# Patient Record
Sex: FEMALE | Race: WHITE | NOT HISPANIC OR LATINO | Employment: OTHER | URBAN - METROPOLITAN AREA
[De-identification: names, ages, dates, MRNs, and addresses within clinical notes are randomized per-mention and may not be internally consistent; named-entity substitution may affect disease eponyms.]

---

## 2017-06-30 LAB — HBA1C MFR BLD HPLC: 5.7 %

## 2018-04-10 ENCOUNTER — OFFICE VISIT (OUTPATIENT)
Dept: FAMILY MEDICINE CLINIC | Facility: CLINIC | Age: 63
End: 2018-04-10
Payer: COMMERCIAL

## 2018-04-10 VITALS
DIASTOLIC BLOOD PRESSURE: 78 MMHG | WEIGHT: 192 LBS | TEMPERATURE: 96.5 F | BODY MASS INDEX: 30.86 KG/M2 | HEART RATE: 80 BPM | HEIGHT: 66 IN | SYSTOLIC BLOOD PRESSURE: 118 MMHG

## 2018-04-10 DIAGNOSIS — R73.03 PREDIABETES: ICD-10-CM

## 2018-04-10 DIAGNOSIS — Z72.0 TOBACCO USE: ICD-10-CM

## 2018-04-10 DIAGNOSIS — N18.2 STAGE 2 CHRONIC KIDNEY DISEASE: ICD-10-CM

## 2018-04-10 DIAGNOSIS — L82.1 SK (SEBORRHEIC KERATOSIS): ICD-10-CM

## 2018-04-10 DIAGNOSIS — Z11.59 ENCOUNTER FOR HEPATITIS C SCREENING TEST FOR LOW RISK PATIENT: ICD-10-CM

## 2018-04-10 DIAGNOSIS — E66.09 CLASS 1 OBESITY DUE TO EXCESS CALORIES WITHOUT SERIOUS COMORBIDITY WITH BODY MASS INDEX (BMI) OF 30.0 TO 30.9 IN ADULT: ICD-10-CM

## 2018-04-10 DIAGNOSIS — Z76.89 ENCOUNTER TO ESTABLISH CARE WITH NEW DOCTOR: Primary | ICD-10-CM

## 2018-04-10 DIAGNOSIS — K63.5 POLYP OF COLON, UNSPECIFIED PART OF COLON, UNSPECIFIED TYPE: ICD-10-CM

## 2018-04-10 DIAGNOSIS — E55.9 VITAMIN D DEFICIENCY: ICD-10-CM

## 2018-04-10 DIAGNOSIS — E03.9 ACQUIRED HYPOTHYROIDISM: ICD-10-CM

## 2018-04-10 DIAGNOSIS — K76.0 FATTY LIVER: ICD-10-CM

## 2018-04-10 DIAGNOSIS — L90.0 LICHEN SCLEROSUS: ICD-10-CM

## 2018-04-10 DIAGNOSIS — G25.0 BENIGN HEAD TREMOR: ICD-10-CM

## 2018-04-10 DIAGNOSIS — E78.49 OTHER HYPERLIPIDEMIA: ICD-10-CM

## 2018-04-10 DIAGNOSIS — D72.820 PERSISTENT LYMPHOCYTOSIS: ICD-10-CM

## 2018-04-10 DIAGNOSIS — L72.3 SEBACEOUS CYST: ICD-10-CM

## 2018-04-10 DIAGNOSIS — C44.619 BASAL CELL CARCINOMA (BCC) OF SKIN OF LEFT UPPER EXTREMITY INCLUDING SHOULDER: ICD-10-CM

## 2018-04-10 DIAGNOSIS — M25.511 ACUTE PAIN OF RIGHT SHOULDER: ICD-10-CM

## 2018-04-10 DIAGNOSIS — H35.711: ICD-10-CM

## 2018-04-10 PROBLEM — E66.811 CLASS 1 OBESITY DUE TO EXCESS CALORIES WITHOUT SERIOUS COMORBIDITY WITH BODY MASS INDEX (BMI) OF 30.0 TO 30.9 IN ADULT: Status: ACTIVE | Noted: 2018-04-10

## 2018-04-10 PROCEDURE — 99406 BEHAV CHNG SMOKING 3-10 MIN: CPT | Performed by: FAMILY MEDICINE

## 2018-04-10 PROCEDURE — 99204 OFFICE O/P NEW MOD 45 MIN: CPT | Performed by: FAMILY MEDICINE

## 2018-04-10 RX ORDER — NAPROXEN 500 MG/1
500 TABLET ORAL 2 TIMES DAILY WITH MEALS
Qty: 30 TABLET | Refills: 0 | Status: SHIPPED | OUTPATIENT
Start: 2018-04-10 | End: 2018-05-16 | Stop reason: ALTCHOICE

## 2018-04-10 RX ORDER — LEVOTHYROXINE SODIUM 112 UG/1
112 TABLET ORAL DAILY
Qty: 30 TABLET | Refills: 0 | Status: SHIPPED | OUTPATIENT
Start: 2018-04-10 | End: 2018-04-13

## 2018-04-10 NOTE — ASSESSMENT & PLAN NOTE
Under patient indeed does have chronic kidney disease, denies any history of acute kidney kidney injury or history of urinary symptoms, GFR was 81 in 2017, will recheck CMP

## 2018-04-10 NOTE — ASSESSMENT & PLAN NOTE
Discussed with patient the risks of continued smoking  Discussed current use pattern  Smokes 1 pack/week  I advised patient to quit, and offered support  Pt expressed understand, is pre-contemplative at this time  Asked patient to inform me when she sets a quit date  Offered assistance for whenever she is ready to quit  I spent approximately 3 minutes counseling the patient

## 2018-04-10 NOTE — ASSESSMENT & PLAN NOTE
Patient had been seen dermatology and diagnosed her with lichen sclerosus of the vagina, uses Lidex p r n , refill provided today  Given her other dermatologic concerns including basal cell carcinoma, will refer to establish care with a dermatologist in the area

## 2018-04-10 NOTE — ASSESSMENT & PLAN NOTE
Patient has had retinal injections, was seeing a retinal specialist, will refer to vitreal retinal surgery

## 2018-04-10 NOTE — ASSESSMENT & PLAN NOTE
White blood cell count was elevated in the past, reviewed labs last CBC was July 2017 showed a white blood cell count of 8 1 with elevated lymphocytes of 4 0, will check CBC

## 2018-04-10 NOTE — ASSESSMENT & PLAN NOTE
Was seeing Dermatology regularly, last removal was in 2016   Has multiple skin lesions removed from her face, arms, back, shoulders  -Derm referral

## 2018-04-10 NOTE — ASSESSMENT & PLAN NOTE
Thyroid US on 5/16/12 showed lobulated thyroid    Last TSH was 2 37 in June of 2017, she had been told to decrease her Synthroid to 112 micrograms and she only had 125 microgram pills left side has been biting off her pills, will check TSH, in the meantime did refill Synthroid 112 micrograms

## 2018-04-10 NOTE — ASSESSMENT & PLAN NOTE
Recent CMP done in 2017 which was normal for LFTs  Imaging done in 2014 showed a fatty liver, had been on vitamin E 400 units daily then recommended weight loss, patient has lost weight since that time  Recommended do LFTs yearly

## 2018-04-10 NOTE — ASSESSMENT & PLAN NOTE
Multiple case noted on her bilateral upper extremities, has had shave biopsies performed by Dermatology in the past

## 2018-04-10 NOTE — ASSESSMENT & PLAN NOTE
Vitamin-D level of 24 in 2016, repeat in 2017 was 32, she is only intermittently taking vitamin-D supplement

## 2018-04-10 NOTE — PROGRESS NOTES
FAMILY MEDICINE NEW PATIENT NOTE  Addison Mathur 70 y o  female   MRN: [id number]      ASSESSMENT and PLAN:  Addison Mathur is a 70 y o  female here to establish care with:     Benign head tremor  Due to anxiety    Basal cell carcinoma (BCC) of skin of left upper extremity including shoulder  Was seeing Dermatology regularly, last removal was in 2016  Has multiple skin lesions removed from her face, arms, back, shoulders  -Derm referral    Tobacco use  Discussed with patient the risks of continued smoking  Discussed current use pattern  Smokes 1 pack/week  I advised patient to quit, and offered support  Pt expressed understand, is pre-contemplative at this time  Asked patient to inform me when she sets a quit date  Offered assistance for whenever she is ready to quit  I spent approximately 3 minutes counseling the patient  Acquired hypothyroidism  Thyroid US on 5/16/12 showed lobulated thyroid    Last TSH was 2 37 in June of 2017, she had been told to decrease her Synthroid to 112 micrograms and she only had 125 microgram pills left side has been biting off her pills, will check TSH, in the meantime did refill Synthroid 112 micrograms  Persistent lymphocytosis    White blood cell count was elevated in the past, reviewed labs last CBC was July 2017 showed a white blood cell count of 8 1 with elevated lymphocytes of 4 0, will check CBC  Vitamin D deficiency    Vitamin-D level of 24 in 2016, repeat in 2017 was 32, she is only intermittently taking vitamin-D supplement  Prediabetes    A1c was 5 7 percent in June of 2017, recheck A1c with labs  Lichen sclerosus    Patient had been seen dermatology and diagnosed her with lichen sclerosus of the vagina, uses Lidex p r n , refill provided today  Given her other dermatologic concerns including basal cell carcinoma, will refer to establish care with a dermatologist in the area      Stage 2 chronic kidney disease    Under patient indeed does have chronic kidney disease, denies any history of acute kidney kidney injury or history of urinary symptoms, GFR was 81 in 2017, will recheck CMP  Sebaceous cyst   Has a history of sebaceous cyst on scalp and forearm that was excised in 2012  SK (seborrheic keratosis)   Multiple case noted on her bilateral upper extremities, has had shave biopsies performed by Dermatology in the past     Fatty liver    Recent CMP done in 2017 which was normal for LFTs  Imaging done in 2014 showed a fatty liver, had been on vitamin E 400 units daily then recommended weight loss, patient has lost weight since that time  Recommended do LFTs yearly  Class 1 obesity due to excess calories without serious comorbidity with body mass index (BMI) of 30 0 to 30 9 in adult  -Body mass index is 30 99 kg/m²  -Reviewed healthy diet- high in protein, low in carbohydrates, high calorie/low nutrition foods, try to stop drinking regular and diet sodas  -Drink at least 8 glasses of pure water a day  -Get at least 30 minutes of breathless exercise 4-5 days per week      Other hyperlipidemia  No recent lipid panel available for review, recheck FLP    Polyp of colon  Last colonoscopy in 2012 showed mild diverticulosis  Refer to GI for repeat GI    Encounter for hepatitis C screening test for low risk patient    Hep C screen ordered    Acute pain of right shoulder   Likely musculoskeletal in origin based on normal physical exam, likely secondary to recent exertion with moving  Recommended that she do naproxen 500 milligrams b i d  Scheduled for 1 week then p r n  Central serous choroidopathy of right eye   Patient has had retinal injections, was seeing a retinal specialist, will refer to vitreal retinal surgery  Return to clinic in 1 month for Medicare annual wellness visit    SUBJECTIVE:  Gaurang Becerril is a 70 y o  female who presents today with a chief complaint of Establish Care (new to establish care);  Hypothyroidism; and Hyperlipidemia  Recently moved her from [place], [place], living here with her aunt and uncle  States that she wanted a less stressful lifestyle    Acute concerns:  1  Hasnt had her thyroid medications for awhile, she has been biting off of 125 mcg tablets to make a 112mcg dose  2  Right shoulder pain- likely from moving boxes recently     Health Maintenance: Pt has records available for review  Last colonoscopy was approximately 4 years ago, has had 2, one polyp in the past   Last Pap smear in 2016-   Had negative Pap smear and negative HPV on December 5, 2016  Last mammo Dec 6, 2016- Birads 1 Negative, annual mammos      Hyperlipidemia   Pertinent negatives include no chest pain, leg pain, myalgias or shortness of breath  Review of Systems   Constitutional: Positive for fatigue  Negative for chills and fever  HENT: Negative for hearing loss  Eyes: Positive for visual disturbance (sees retina specialist)  Respiratory: Negative for cough and shortness of breath  Cardiovascular: Negative for chest pain and palpitations  Gastrointestinal: Negative for abdominal pain, nausea and vomiting  Genitourinary: Negative for difficulty urinating  Musculoskeletal: Positive for arthralgias (right shoulder pain)  Negative for myalgias  Skin: Negative for rash  Allergic/Immunologic: Negative for environmental allergies  Hematological: Does not bruise/bleed easily  Psychiatric/Behavioral: The patient is nervous/anxious  I have reviewed the patient's PMH, Surgical History, Family History, Social History, Medication List and Allergies        Histories Reviewed and Updated 4/10/2018:  Patient's Medications   New Prescriptions    FLUOCINONIDE (LIDEX) 0 05 % CREAM    Apply topically 2 (two) times a day    LEVOTHYROXINE 112 MCG TABLET    Take 1 tablet (112 mcg total) by mouth daily    NAPROXEN (NAPROSYN) 500 MG TABLET    Take 1 tablet (500 mg total) by mouth 2 (two) times a day with meals for 10 days Previous Medications    No medications on file   Modified Medications    No medications on file   Discontinued Medications    No medications on file     No Known Allergies  No past medical history on file  Past Surgical History:   Procedure Laterality Date    CHOLECYSTECTOMY           Social History     Social History    Marital status:      Spouse name: N/A    Number of children: N/A    Years of education: N/A     Occupational History    Not on file  Social History Main Topics    Smoking status: Not on file    Smokeless tobacco: Not on file    Alcohol use Not on file    Drug use: Unknown    Sexual activity: Not on file     Other Topics Concern    Not on file     Social History Narrative    No narrative on file     PHQ-9 Depression Screening    PHQ-9:    Frequency of the following problems over the past two weeks:       Little interest or pleasure in doing things:  0 - not at all  Feeling down, depressed, or hopeless:  1 - several days  Trouble falling or staying asleep, or sleeping too much:  0 - not at all  Feeling tired or having little energy:  3 - nearly every day  Poor appetite or overeatin - not at all  Feeling bad about yourself - or that you are a failure or have let yourself or your family down:  0 - not at all  Trouble concentrating on things, such as reading the newspaper or watching television:  0 - not at all  Moving or speaking so slowly that other people could have noticed  Or the opposite - being so fidgety or restless that you have been moving around a lot more than usual:  0 - not at all  Thoughts that you would be better off dead, or of hurting yourself in some way:  0 - not at all  PHQ-2 Score:  1  PHQ-9 Score:  4         No family history on file  There is no immunization history on file for this patient    OBJECTIVE:  /78 (BP Location: Left arm, Patient Position: Sitting, Cuff Size: Large)   Pulse 80   Temp (!) 96 5 °F (35 8 °C)   Ht 5' 6" (1 676 m)   Wt 87 1 kg (192 lb)   Breastfeeding? No   BMI 30 99 kg/m²   Physical Exam   Constitutional: She is oriented to person, place, and time  She appears well-developed and well-nourished  No distress  HENT:   Head: Normocephalic and atraumatic  Mouth/Throat: Oropharynx is clear and moist  No oropharyngeal exudate  Eyes: Conjunctivae are normal  Pupils are equal, round, and reactive to light  Right eye exhibits no discharge  Left eye exhibits no discharge  Neck: Normal range of motion  Neck supple  Cardiovascular: Normal rate, regular rhythm and normal heart sounds  Pulmonary/Chest: Effort normal and breath sounds normal  No respiratory distress  She has no wheezes  She has no rales  Abdominal: Soft  Bowel sounds are normal  She exhibits no distension  There is no tenderness  Lymphadenopathy:     She has no cervical adenopathy  Neurological: She is alert and oriented to person, place, and time  Skin: She is not diaphoretic  Vitals reviewed

## 2018-04-10 NOTE — ASSESSMENT & PLAN NOTE
-Body mass index is 30 99 kg/m²    -Reviewed healthy diet- high in protein, low in carbohydrates, high calorie/low nutrition foods, try to stop drinking regular and diet sodas  -Drink at least 8 glasses of pure water a day  -Get at least 30 minutes of breathless exercise 4-5 days per week

## 2018-04-12 ENCOUNTER — APPOINTMENT (OUTPATIENT)
Dept: LAB | Facility: CLINIC | Age: 63
End: 2018-04-12
Payer: COMMERCIAL

## 2018-04-12 ENCOUNTER — TELEPHONE (OUTPATIENT)
Dept: FAMILY MEDICINE CLINIC | Facility: CLINIC | Age: 63
End: 2018-04-12

## 2018-04-12 DIAGNOSIS — R73.03 PREDIABETES: ICD-10-CM

## 2018-04-12 DIAGNOSIS — E66.09 CLASS 1 OBESITY DUE TO EXCESS CALORIES WITHOUT SERIOUS COMORBIDITY WITH BODY MASS INDEX (BMI) OF 30.0 TO 30.9 IN ADULT: ICD-10-CM

## 2018-04-12 DIAGNOSIS — D72.820 PERSISTENT LYMPHOCYTOSIS: ICD-10-CM

## 2018-04-12 DIAGNOSIS — E03.9 ACQUIRED HYPOTHYROIDISM: Primary | ICD-10-CM

## 2018-04-12 DIAGNOSIS — Z11.59 ENCOUNTER FOR HEPATITIS C SCREENING TEST FOR LOW RISK PATIENT: ICD-10-CM

## 2018-04-12 DIAGNOSIS — E55.9 VITAMIN D DEFICIENCY: ICD-10-CM

## 2018-04-12 DIAGNOSIS — K76.0 FATTY LIVER: ICD-10-CM

## 2018-04-12 PROBLEM — E78.49 OTHER HYPERLIPIDEMIA: Status: RESOLVED | Noted: 2018-04-10 | Resolved: 2018-04-12

## 2018-04-12 PROBLEM — N18.2 STAGE 2 CHRONIC KIDNEY DISEASE: Status: RESOLVED | Noted: 2018-04-10 | Resolved: 2018-04-12

## 2018-04-12 LAB
25(OH)D3 SERPL-MCNC: 14.8 NG/ML (ref 30–100)
ALBUMIN SERPL BCP-MCNC: 3.8 G/DL (ref 3.5–5)
ALP SERPL-CCNC: 80 U/L (ref 46–116)
ALT SERPL W P-5'-P-CCNC: 23 U/L (ref 12–78)
ANION GAP SERPL CALCULATED.3IONS-SCNC: 6 MMOL/L (ref 4–13)
AST SERPL W P-5'-P-CCNC: 19 U/L (ref 5–45)
BASOPHILS # BLD AUTO: 0.06 THOUSANDS/ΜL (ref 0–0.1)
BASOPHILS NFR BLD AUTO: 1 % (ref 0–1)
BILIRUB SERPL-MCNC: 0.72 MG/DL (ref 0.2–1)
BUN SERPL-MCNC: 11 MG/DL (ref 5–25)
CALCIUM SERPL-MCNC: 9.5 MG/DL
CHLORIDE SERPL-SCNC: 109 MMOL/L (ref 100–108)
CHOLEST SERPL-MCNC: 183 MG/DL (ref 50–200)
CO2 SERPL-SCNC: 26 MMOL/L (ref 21–32)
CREAT SERPL-MCNC: 0.74 MG/DL (ref 0.6–1.3)
EOSINOPHIL # BLD AUTO: 0.37 THOUSAND/ΜL (ref 0–0.61)
EOSINOPHIL NFR BLD AUTO: 5 % (ref 0–6)
ERYTHROCYTE [DISTWIDTH] IN BLOOD BY AUTOMATED COUNT: 14.8 % (ref 11.6–15.1)
EST. AVERAGE GLUCOSE BLD GHB EST-MCNC: 114 MG/DL
GFR SERPL CREATININE-BSD FRML MDRD: 85 ML/MIN/1.73SQ M
GLUCOSE P FAST SERPL-MCNC: 89 MG/DL (ref 65–99)
HBA1C MFR BLD: 5.6 % (ref 4.2–6.3)
HCT VFR BLD AUTO: 44 % (ref 34.8–46.1)
HCV AB SER QL: NORMAL
HDLC SERPL-MCNC: 69 MG/DL (ref 40–60)
HGB BLD-MCNC: 14.6 G/DL (ref 11.5–15.4)
LDLC SERPL CALC-MCNC: 99 MG/DL (ref 0–100)
LYMPHOCYTES # BLD AUTO: 4.03 THOUSANDS/ΜL (ref 0.6–4.47)
LYMPHOCYTES NFR BLD AUTO: 50 % (ref 14–44)
MCH RBC QN AUTO: 27.7 PG (ref 26.8–34.3)
MCHC RBC AUTO-ENTMCNC: 33.2 G/DL (ref 31.4–37.4)
MCV RBC AUTO: 84 FL (ref 82–98)
MONOCYTES # BLD AUTO: 0.63 THOUSAND/ΜL (ref 0.17–1.22)
MONOCYTES NFR BLD AUTO: 8 % (ref 4–12)
NEUTROPHILS # BLD AUTO: 2.89 THOUSANDS/ΜL (ref 1.85–7.62)
NEUTS SEG NFR BLD AUTO: 36 % (ref 43–75)
NRBC BLD AUTO-RTO: 0 /100 WBCS
PLATELET # BLD AUTO: 242 THOUSANDS/UL (ref 149–390)
PMV BLD AUTO: 10.8 FL (ref 8.9–12.7)
POTASSIUM SERPL-SCNC: 4.2 MMOL/L (ref 3.5–5.3)
PROT SERPL-MCNC: 7.6 G/DL (ref 6.4–8.2)
RBC # BLD AUTO: 5.27 MILLION/UL (ref 3.81–5.12)
SODIUM SERPL-SCNC: 141 MMOL/L (ref 136–145)
T4 FREE SERPL-MCNC: 0.92 NG/DL (ref 0.76–1.46)
TRIGL SERPL-MCNC: 73 MG/DL
TSH SERPL DL<=0.05 MIU/L-ACNC: 25 UIU/ML (ref 0.36–3.74)
WBC # BLD AUTO: 7.99 THOUSAND/UL (ref 4.31–10.16)

## 2018-04-12 PROCEDURE — 82306 VITAMIN D 25 HYDROXY: CPT

## 2018-04-12 PROCEDURE — 80061 LIPID PANEL: CPT | Performed by: FAMILY MEDICINE

## 2018-04-12 PROCEDURE — 86803 HEPATITIS C AB TEST: CPT

## 2018-04-12 PROCEDURE — 84443 ASSAY THYROID STIM HORMONE: CPT | Performed by: FAMILY MEDICINE

## 2018-04-12 PROCEDURE — 80053 COMPREHEN METABOLIC PANEL: CPT

## 2018-04-12 PROCEDURE — 84439 ASSAY OF FREE THYROXINE: CPT | Performed by: FAMILY MEDICINE

## 2018-04-12 PROCEDURE — 83036 HEMOGLOBIN GLYCOSYLATED A1C: CPT | Performed by: FAMILY MEDICINE

## 2018-04-12 PROCEDURE — 85025 COMPLETE CBC W/AUTO DIFF WBC: CPT

## 2018-04-12 PROCEDURE — 36415 COLL VENOUS BLD VENIPUNCTURE: CPT | Performed by: FAMILY MEDICINE

## 2018-04-12 NOTE — TELEPHONE ENCOUNTER
Please call the lab and make sure that this TSH gets added to the existing specimen  For some reason that order did not go through, thanks!

## 2018-04-13 ENCOUNTER — TELEPHONE (OUTPATIENT)
Dept: FAMILY MEDICINE CLINIC | Facility: CLINIC | Age: 63
End: 2018-04-13

## 2018-04-13 DIAGNOSIS — E55.9 VITAMIN D DEFICIENCY: ICD-10-CM

## 2018-04-13 DIAGNOSIS — E03.9 ACQUIRED HYPOTHYROIDISM: Primary | ICD-10-CM

## 2018-04-13 RX ORDER — ERGOCALCIFEROL 1.25 MG/1
50000 CAPSULE ORAL WEEKLY
Qty: 12 CAPSULE | Refills: 0 | Status: SHIPPED | OUTPATIENT
Start: 2018-04-13 | End: 2018-08-21 | Stop reason: ALTCHOICE

## 2018-04-13 RX ORDER — LEVOTHYROXINE SODIUM 137 UG/1
137 TABLET ORAL DAILY
Qty: 45 TABLET | Refills: 0 | Status: SHIPPED | OUTPATIENT
Start: 2018-04-13 | End: 2018-06-03 | Stop reason: SDUPTHER

## 2018-04-13 NOTE — TELEPHONE ENCOUNTER
Please call Maurizio Quan and let her know that her labs were normal for electrolytes, kidney function, liver function, lymphocyte count, blood counts and cholesterol is completely normal  She no longer has prediabetes! The two things we need to change  1  Vit D was quite low, I sent in weekly 50,000 IU pills to take for 2 months then can do daily supplements  2  Thyroid is quite low, I want her to do a new dosage, 137 mcg daily and recheck TSH in 6 weeks, she can schedule her follow up with me at that time instead if she wishes             Telephone on 04/12/2018   Component Date Value Ref Range Status    TSH 3RD GENERATON 04/12/2018 25 000* 0 358 - 3 740 uIU/mL Final    Free T4 04/12/2018 0 92  0 76 - 1 46 ng/dL Final   Appointment on 04/12/2018   Component Date Value Ref Range Status    Vit D, 25-Hydroxy 04/12/2018 14 8* 30 0 - 100 0 ng/mL Final    Hepatitis C Ab 04/12/2018 Non-reactive  Non-reactive Final    Sodium 04/12/2018 141  136 - 145 mmol/L Final    Potassium 04/12/2018 4 2  3 5 - 5 3 mmol/L Final    Chloride 04/12/2018 109* 100 - 108 mmol/L Final    CO2 04/12/2018 26  21 - 32 mmol/L Final    Anion Gap 04/12/2018 6  4 - 13 mmol/L Final    BUN 04/12/2018 11  5 - 25 mg/dL Final    Creatinine 04/12/2018 0 74  0 60 - 1 30 mg/dL Final    Glucose, Fasting 04/12/2018 89  65 - 99 mg/dL Final    Calcium 04/12/2018 9 5  mg/dL Final    AST 04/12/2018 19  5 - 45 U/L Final    ALT 04/12/2018 23  12 - 78 U/L Final    Alkaline Phosphatase 04/12/2018 80  46 - 116 U/L Final    Total Protein 04/12/2018 7 6  6 4 - 8 2 g/dL Final    Albumin 04/12/2018 3 8  3 5 - 5 0 g/dL Final    Total Bilirubin 04/12/2018 0 72  0 20 - 1 00 mg/dL Final    eGFR 04/12/2018 85  ml/min/1 73sq m Final    WBC 04/12/2018 7 99  4 31 - 10 16 Thousand/uL Final    RBC 04/12/2018 5 27* 3 81 - 5 12 Million/uL Final    Hemoglobin 04/12/2018 14 6  11 5 - 15 4 g/dL Final    Hematocrit 04/12/2018 44 0  34 8 - 46 1 % Final    MCV 04/12/2018 84  82 - 98 fL Final    MCH 04/12/2018 27 7  26 8 - 34 3 pg Final    MCHC 04/12/2018 33 2  31 4 - 37 4 g/dL Final    RDW 04/12/2018 14 8  11 6 - 15 1 % Final    MPV 04/12/2018 10 8  8 9 - 12 7 fL Final    Platelets 59/92/0307 242  149 - 390 Thousands/uL Final    nRBC 04/12/2018 0  /100 WBCs Final    Neutrophils Relative 04/12/2018 36* 43 - 75 % Final    Lymphocytes Relative 04/12/2018 50* 14 - 44 % Final    Monocytes Relative 04/12/2018 8  4 - 12 % Final    Eosinophils Relative 04/12/2018 5  0 - 6 % Final    Basophils Relative 04/12/2018 1  0 - 1 % Final    Neutrophils Absolute 04/12/2018 2 89  1 85 - 7 62 Thousands/µL Final    Lymphocytes Absolute 04/12/2018 4 03  0 60 - 4 47 Thousands/µL Final    Monocytes Absolute 04/12/2018 0 63  0 17 - 1 22 Thousand/µL Final    Eosinophils Absolute 04/12/2018 0 37  0 00 - 0 61 Thousand/µL Final    Basophils Absolute 04/12/2018 0 06  0 00 - 0 10 Thousands/µL Final   Office Visit on 04/10/2018   Component Date Value Ref Range Status    Cholesterol 04/12/2018 183  50 - 200 mg/dL Final    Triglycerides 04/12/2018 73  <=150 mg/dL Final    HDL, Direct 04/12/2018 69* 40 - 60 mg/dL Final    LDL Calculated 04/12/2018 99  0 - 100 mg/dL Final    Hemoglobin A1C 04/12/2018 5 6  4 2 - 6 3 % Final    EAG 04/12/2018 114  mg/dl Final

## 2018-05-16 ENCOUNTER — OFFICE VISIT (OUTPATIENT)
Dept: FAMILY MEDICINE CLINIC | Facility: CLINIC | Age: 63
End: 2018-05-16
Payer: COMMERCIAL

## 2018-05-16 VITALS
DIASTOLIC BLOOD PRESSURE: 78 MMHG | HEART RATE: 88 BPM | BODY MASS INDEX: 29.89 KG/M2 | TEMPERATURE: 97.3 F | HEIGHT: 66 IN | WEIGHT: 186 LBS | SYSTOLIC BLOOD PRESSURE: 118 MMHG

## 2018-05-16 DIAGNOSIS — H35.711: ICD-10-CM

## 2018-05-16 DIAGNOSIS — E78.49 OTHER HYPERLIPIDEMIA: ICD-10-CM

## 2018-05-16 DIAGNOSIS — E66.09 CLASS 1 OBESITY DUE TO EXCESS CALORIES WITHOUT SERIOUS COMORBIDITY WITH BODY MASS INDEX (BMI) OF 30.0 TO 30.9 IN ADULT: ICD-10-CM

## 2018-05-16 DIAGNOSIS — E03.9 ACQUIRED HYPOTHYROIDISM: Primary | ICD-10-CM

## 2018-05-16 DIAGNOSIS — Z72.0 TOBACCO USE: ICD-10-CM

## 2018-05-16 DIAGNOSIS — N18.2 STAGE 2 CHRONIC KIDNEY DISEASE: ICD-10-CM

## 2018-05-16 DIAGNOSIS — R73.03 PREDIABETES: ICD-10-CM

## 2018-05-16 DIAGNOSIS — E55.9 VITAMIN D DEFICIENCY: ICD-10-CM

## 2018-05-16 DIAGNOSIS — Z12.11 SCREENING FOR COLON CANCER: ICD-10-CM

## 2018-05-16 PROBLEM — M25.511 ACUTE PAIN OF RIGHT SHOULDER: Status: RESOLVED | Noted: 2018-04-10 | Resolved: 2018-05-16

## 2018-05-16 PROCEDURE — 99406 BEHAV CHNG SMOKING 3-10 MIN: CPT | Performed by: FAMILY MEDICINE

## 2018-05-16 PROCEDURE — 3725F SCREEN DEPRESSION PERFORMED: CPT | Performed by: FAMILY MEDICINE

## 2018-05-16 PROCEDURE — 1101F PT FALLS ASSESS-DOCD LE1/YR: CPT | Performed by: FAMILY MEDICINE

## 2018-05-16 PROCEDURE — 99214 OFFICE O/P EST MOD 30 MIN: CPT | Performed by: FAMILY MEDICINE

## 2018-05-16 RX ORDER — MULTIVIT WITH CALCIUM,IRON,MIN
1 TABLET ORAL DAILY
COMMUNITY

## 2018-05-16 RX ORDER — VIT A/VIT C/VIT E/ZINC/COPPER 7160-113
1 TABLET, DELAYED RELEASE (ENTERIC COATED) ORAL 2 TIMES DAILY
COMMUNITY
End: 2018-08-21

## 2018-05-16 NOTE — ASSESSMENT & PLAN NOTE
Pt has not scheduled a colonoscopy yet, she is dreading the prep  Will do FIT test first and if positive, will do colonoscopy at that time

## 2018-05-16 NOTE — ASSESSMENT & PLAN NOTE
Patient established with [facility] here in [facility], states that they only have to see her every 6 months  Patient will be seeing a local optometrist to get her regular glasses prescription

## 2018-05-16 NOTE — ASSESSMENT & PLAN NOTE
Lab Results   Component Value Date    CALCIUM 9 5 04/12/2018     04/12/2018    K 4 2 04/12/2018    CO2 26 04/12/2018     (H) 04/12/2018    BUN 11 04/12/2018    CREATININE 0 74 04/12/2018     Normal GFR without evidence of kidney damage

## 2018-05-16 NOTE — ASSESSMENT & PLAN NOTE
-Body mass index is 30 02 kg/m²     Wt Readings from Last 3 Encounters:   05/16/18 84 4 kg (186 lb)   04/10/18 87 1 kg (192 lb)   Has lost 6 pounds since our last visit!  -Reviewed healthy diet- high in protein, low in carbohydrates, high calorie/low nutrition foods, try to stop drinking regular and diet sodas  -Drink at least 8 glasses of pure water a day  -Get at least 30 minutes of breathless exercise 4-5 days per week

## 2018-05-16 NOTE — ASSESSMENT & PLAN NOTE
Thyroid US on 5/16/12 showed lobulated thyroid  TSH in April 2018 was 28, and previously was 2 37 in June of 2017     Has lost 6 lb since restarting Synthroid, but not has not noticed a difference in her fatigue   -Restarted Synthroid at higher dose 137mcg  -Recheck TSH in 2 weeks and adjust accordingly

## 2018-05-16 NOTE — PATIENT INSTRUCTIONS
Vitamin D Deficiency   AMBULATORY CARE:   Vitamin D deficiency  is a low level of vitamin D in your body  Vitamin D helps your body absorb calcium from foods  Your body makes vitamin D when your skin is exposed to sunlight  You can also get vitamin D from certain foods such as fish, eggs, and meat  Most of the vitamin D in your body comes from sunlight exposure  Common symptoms include the following:  Low levels of vitamin D can lead to weak and brittle bones that are more likely to fracture  You may not have any signs and symptoms, or you may have any of the following:  · Bone pain or discomfort in your lower back, pelvis, or legs    · Muscle aches and weakness    · Low back pain in women    · Poor growth, irritability, and frequent respiratory tract infections in infants    · Deformed bones and slow growth in children  Contact your healthcare provider for the following symptoms:   · Continued or worsening symptoms    · Nausea, vomiting, or a headache after possibly taking too much of a vitamin D supplement    · Questions or concerns about your condition or care  Treatment for vitamin D deficiency  includes high doses of vitamin D for 8 to 12 weeks to increase your levels  Your levels will then be rechecked  If your levels are still low, you will need to take vitamin D supplements for another 8 weeks  After your levels have gone back to normal, you may need to continue to take a vitamin D supplement  Amount of vitamin D do you need each day:  The amount of vitamin D you need depends on your age  You may need more than the recommended amounts below if you take certain medicines or you are obese  Ask your healthcare provider how much vitamin D you need  · Infants up to 1 year of age: 0 international units (IU)    · Children 1 year and older: 600 IU    · Adults aged 23to 79years old: 600 IU    · Adults older than 70 years: 800 IU  Prevent vitamin D deficiency:   · Eat foods that are high in vitamin D    Fatty fish such as mackerel, canned tuna and sardines, and salmon are good sources of vitamin D  Certain foods such as milk, juice, and cereal are fortified with vitamin D      · Give your  infant a vitamin D supplement  of 400 IU each day  · Take vitamin D supplements as directed  High doses of vitamin D can be toxic  Your healthcare provider will tell you how much vitamin D you should take each day  Vitamin D is best absorbed when taken with food  · Expose your skin to sunlight as directed  Ask your healthcare provider how you can safely expose your skin to sunlight and for how long  Too much exposure to sunlight can cause skin cancer  Follow up with your healthcare provider as directed:  Write down your questions so you remember to ask them during your visits  © 2017 [facility] Information is for End User's use only and may not be sold, redistributed or otherwise used for commercial purposes  All illustrations and images included in CareNotes® are the copyrighted property of A D A M , Inc  or [employer]  The above information is an  only  It is not intended as medical advice for individual conditions or treatments  Talk to your doctor, nurse or pharmacist before following any medical regimen to see if it is safe and effective for you  Calcium/Vitamin D Supplement (By mouth)   Calcium (LANI-see-um), Vitamin D (VYE-ta-min D)  Supplies your body with calcium if you need more than you get in your diet  Calcium helps prevent osteoporosis (weak or brittle bones)  Vitamin D helps your body use the calcium  Calcium and vitamin D are minerals that your body needs to work properly     Brand Name(s): Daniel-Citrate, Daniel-Citrate Plus Vitamin D, Calcet Petites, Calcium 600MG+D, Calcium Citrate +D3 Maximum, Caltrate 600 + D, Citracal + D, Citracal Calcium Citrate Petites with Vitamin D, Citracal Petites, Citracal Ultradense Calcium Citrate, Citracal Ultradense Calcium Citrate Petite w/Vit D, Citrus Calcium with Vitamin D, D-1000, D-2000, D3-400IU   There may be other brand names for this medicine  When This Medicine Should Not Be Used: You should not use this medicine if you have had an allergic reaction to calcium or vitamin D (ergocalciferol)  How to Use This Medicine:   Tablet, Long Acting Tablet, Fizzy Tablet, Liquid Filled Capsule, Chewable Tablet  · Your doctor will tell you how much medicine to use  Do not use more than directed  · Follow the instructions on the medicine label if you are using this medicine without a prescription  Ask your pharmacist or health caregiver if you are not sure how much calcium you should take in one day  · Most calcium supplements should be taken with food, but some kinds of calcium (such as calcium citrate) can be taken with or without food  Ask your health care provider or read the label on the bottle to see if you need to take your specific kind of calcium with food  Drink a full glass of water (8 ounces) with each dose  · If you are using the effervescent (fizzy) tablet, dissolve the tablet in about 6 to 8 ounces of water (3/4 cup to 1 cup)  After the tablet is completely dissolved, drink this mixture right away  Do not save any mixture to take later  · Carefully follow your doctor's instructions about any special diet  · If you need to take more than one dose each a day, take each dose at evenly spaced times, unless your doctor has told you otherwise  If a dose is missed:   · Take a dose as soon as you remember  If it is almost time for your next dose, wait until then and take a regular dose  Do not take extra medicine to make up for a missed dose  How to Store and Dispose of This Medicine:   · Store the medicine in a closed container at room temperature, away from heat, moisture, and direct light  If the effervescent (fizzy) tablet comes packaged in foil, do not open the foil until you are ready to use each tablet    · Ask your pharmacist, doctor, or health caregiver about the best way to dispose of any outdated medicine or medicine no longer needed  · Keep all medicine out of the reach of children  Never share your medicine with anyone  Drugs and Foods to Avoid:   Ask your doctor or pharmacist before using any other medicine, including over-the-counter medicines, vitamins, and herbal products  · Make sure your doctor knows if you are also using other supplements or medicines that contain calcium  Tell your doctor if you are also using gallium nitrate (Ganite®), cellulose sodium phosphate (Calcibind®), or etidronate (Didronel®)  · Calcium can change the way other medicines work if you take them at the same time  If you need to use other medicines, take them at least 2 hours before or 2 hours after you take your calcium supplement  This is particularly important if you are also using phenytoin (Dilantin®) or a tetracycline antibiotic to treat an infection (such as doxycycline, minocycline, Vibramycin®)  · Do not take your calcium supplement with a high-fiber meal (such as bran, whole-grain cereal or bread, fresh fruits)  Do not smoke cigarettes or cigars  Do not drink large amounts of alcohol or caffeine (for example, more than about 8 cups of coffee)  Warnings While Using This Medicine:   · Make sure your doctor knows if you are pregnant or breast feeding, or if you have kidney disease or have ever had kidney stones  Tell your doctor if you have had problems with too much calcium (hypercalcemia) or too little calcium in your blood (hypocalcemia)  Some health problems that can cause hypercalcemia are sarcoidosis, or problems with your parathyroid gland  · You should not use certain brands of this medicine if you have kidney disease or are on dialysis, because they may harm your kidneys  Ask your caregiver what brands are best for you  · Some health problems can affect how much calcium you should take   Tell your doctor if you have stomach or digestion problems, such as on-going diarrhea, not absorbing nutrients properly, or not having enough acid in your stomach  · This medicine might contain phenylalanine (aspartame)  This is only a concern if you have a disorder called phenylketonuria (a problem with amino acids)  If you have this condition, talk to your doctor before using this medicine  · If you are using a large amount of calcium or using it for a long time, your doctor might need to check your blood on a regular basis  Be sure to keep all appointments  Possible Side Effects While Using This Medicine:   Call your doctor right away if you notice any of these side effects:  · Headache that will not go away, dry mouth, loss of appetite, severe constipation  If you notice other side effects that you think are caused by this medicine, tell your doctor  Call your doctor for medical advice about side effects  You may report side effects to FDA at 0-973-FDA-1857  © 2017 [address] [employer] Information is for End User's use only and may not be sold, redistributed or otherwise used for commercial purposes  The above information is an  only  It is not intended as medical advice for individual conditions or treatments  Talk to your doctor, nurse or pharmacist before following any medical regimen to see if it is safe and effective for you

## 2018-05-16 NOTE — ASSESSMENT & PLAN NOTE
4/12/18 Vit D lower than before at 14 8, started Vit D 50,000 IU weekly 4 weeks ago, continue for 8 weeks total, then daily supplement  Of at least 2000 units daily, and every other week until prescription is done

## 2018-05-16 NOTE — PROGRESS NOTES
FAMILY MEDICINE PROGRESS NOTE  Bro Landon 70 y o  female   DATE: May 16, 2018     ASSESSMENT and PLAN:  Bro Landon is a 70 y o  female with:     Vitamin D deficiency  4/12/18 Vit D lower than before at 14 8, started Vit D 50,000 IU weekly 4 weeks ago, continue for 8 weeks total, then daily supplement  Of at least 2000 units daily, and every other week until prescription is done  Acquired hypothyroidism  Thyroid US on 5/16/12 showed lobulated thyroid  TSH in April 2018 was 28, and previously was 2 37 in June of 2017  Has lost 6 lb since restarting Synthroid, but not has not noticed a difference in her fatigue   -Restarted Synthroid at higher dose 137mcg  -Recheck TSH in 2 weeks and adjust accordingly    Prediabetes  April 2018 A1c is 5 6% and FBG of 89      Stage 2 chronic kidney disease  Lab Results   Component Value Date    CALCIUM 9 5 04/12/2018     04/12/2018    K 4 2 04/12/2018    CO2 26 04/12/2018     (H) 04/12/2018    BUN 11 04/12/2018    CREATININE 0 74 04/12/2018     Normal GFR without evidence of kidney damage    Other hyperlipidemia  Last Lipid Panel:  Lab Results   Component Value Date    CHOL 183 04/12/2018    HDL 69 (H) 04/12/2018    LDLCALC 99 04/12/2018    TRIG 73 04/12/2018     Normal FLP without stain, do not restart statin           Tobacco use    Currently smokes approximately 1 cigarette per day  Discussed with patient the risks of continued smoking  Discussed current use pattern  I advised patient to quit, and offered support  Pt expressed understanding, is pre-contemplative at this time  Asked patient to inform me when she sets a quit date  Offered assistance for whenever she is ready to quit  I spent approximately 3 minutes counseling the patient  Class 1 obesity due to excess calories without serious comorbidity with body mass index (BMI) of 30 0 to 30 9 in adult  -Body mass index is 30 02 kg/m²     Wt Readings from Last 3 Encounters:   05/16/18 84 4 kg (186 lb)   04/10/18 87 1 kg (192 lb)   Has lost 6 pounds since our last visit!  -Reviewed healthy diet- high in protein, low in carbohydrates, high calorie/low nutrition foods, try to stop drinking regular and diet sodas  -Drink at least 8 glasses of pure water a day  -Get at least 30 minutes of breathless exercise 4-5 days per week      Screening for colon cancer  Pt has not scheduled a colonoscopy yet, she is dreading the prep  Will do FIT test first and if positive, will do colonoscopy at that time  Central serous choroidopathy of right eye   Patient established with [facility] here in [facility], states that they only have to see her every 6 months  Patient will be seeing a local optometrist to get her regular glasses prescription  SUBJECTIVE:  Blanche Stacy is a 70 y o  female who presents today with a chief complaint of Follow-up (labs)  Pt here for follow up of her medications and labs  She had labs done for her known hypothyroidism, fatigue he, vitamin deficiency  Her Vitamin D is low, she talked to her aunt  Who works in the [facility] [employer] and has been trying to change her diet a little bit  Pt still feels fatigued and she has started taking her thyroid   Medication daily, has not noticed a significant difference in her fatigue  Diet: Hasnt made any changes to her diet  Has lost 6 pounds since our last visit 1 month ago  Did go to the retinal specialist (Ana Rosa Christiansen), will go every 6 months, referred to an Optometrist locally  Will schedule with OB/Gyn for her Paps and mammo  Hasnt gotten the colonoscopy or mamogram done yet  Review of Systems   Constitutional: Positive for fatigue  Negative for chills and fever  Respiratory: Negative for cough and shortness of breath  Cardiovascular: Negative for chest pain and palpitations  Musculoskeletal: Positive for arthralgias  Negative for back pain       I have reviewed the patient's PMH, Social History, Medication List and Allergies  OBJECTIVE:  /78 (BP Location: Left arm, Patient Position: Sitting, Cuff Size: Large)   Pulse 88   Temp (!) 97 3 °F (36 3 °C)   Ht 5' 6" (1 676 m)   Wt 84 4 kg (186 lb)   Breastfeeding? No   BMI 30 02 kg/m²   Physical Exam   Constitutional: She appears well-developed and well-nourished  No distress  Cardiovascular: Normal rate, regular rhythm and normal heart sounds  Pulmonary/Chest: Effort normal and breath sounds normal  No respiratory distress  She has no wheezes  She has no rales  Skin: She is not diaphoretic  Vitals reviewed  Orders Only on 05/15/2018   Component Date Value Ref Range Status    EXT Hemoglobin A1C 06/30/2017 5 7   In process   Telephone on 04/12/2018   Component Date Value Ref Range Status    TSH 3RD GENERATON 04/12/2018 25 000* 0 358 - 3 740 uIU/mL Final    Free T4 04/12/2018 0 92  0 76 - 1 46 ng/dL Final      Specimen collection should occur prior to Sulfasalazine administration due to the potential for falsely elevated results  Appointment on 04/12/2018   Component Date Value Ref Range Status    Vit D, 25-Hydroxy 04/12/2018 14 8* 30 0 - 100 0 ng/mL Final    Hepatitis C Ab 04/12/2018 Non-reactive  Non-reactive Final    Sodium 04/12/2018 141  136 - 145 mmol/L Final    Potassium 04/12/2018 4 2  3 5 - 5 3 mmol/L Final    Chloride 04/12/2018 109* 100 - 108 mmol/L Final    CO2 04/12/2018 26  21 - 32 mmol/L Final    Anion Gap 04/12/2018 6  4 - 13 mmol/L Final    BUN 04/12/2018 11  5 - 25 mg/dL Final    Creatinine 04/12/2018 0 74  0 60 - 1 30 mg/dL Final    Standardized to IDMS reference method    Glucose, Fasting 04/12/2018 89  65 - 99 mg/dL Final      Specimen collection should occur prior to Sulfasalazine administration due to the potential for falsely depressed results  Specimen collection should occur prior to Sulfapyridine administration due to the potential for falsely elevated results      Calcium 04/12/2018 9 5  mg/dL Final    AST 04/12/2018 19  5 - 45 U/L Final      Specimen collection should occur prior to Sulfasalazine administration due to the potential for falsely depressed results   ALT 04/12/2018 23  12 - 78 U/L Final      Specimen collection should occur prior to Sulfasalazine and/or Sulfapyridine administration due to the potential for falsely depressed results   Alkaline Phosphatase 04/12/2018 80  46 - 116 U/L Final    Total Protein 04/12/2018 7 6  6 4 - 8 2 g/dL Final    Albumin 04/12/2018 3 8  3 5 - 5 0 g/dL Final    Total Bilirubin 04/12/2018 0 72  0 20 - 1 00 mg/dL Final    eGFR 04/12/2018 85  ml/min/1 73sq m Final    WBC 04/12/2018 7 99  4 31 - 10 16 Thousand/uL Final    RBC 04/12/2018 5 27* 3 81 - 5 12 Million/uL Final    Hemoglobin 04/12/2018 14 6  11 5 - 15 4 g/dL Final    Hematocrit 04/12/2018 44 0  34 8 - 46 1 % Final    MCV 04/12/2018 84  82 - 98 fL Final    MCH 04/12/2018 27 7  26 8 - 34 3 pg Final    MCHC 04/12/2018 33 2  31 4 - 37 4 g/dL Final    RDW 04/12/2018 14 8  11 6 - 15 1 % Final    MPV 04/12/2018 10 8  8 9 - 12 7 fL Final    Platelets 17/05/5009 242  149 - 390 Thousands/uL Final    nRBC 04/12/2018 0  /100 WBCs Final    Neutrophils Relative 04/12/2018 36* 43 - 75 % Final    Lymphocytes Relative 04/12/2018 50* 14 - 44 % Final    Monocytes Relative 04/12/2018 8  4 - 12 % Final    Eosinophils Relative 04/12/2018 5  0 - 6 % Final    Basophils Relative 04/12/2018 1  0 - 1 % Final    Neutrophils Absolute 04/12/2018 2 89  1 85 - 7 62 Thousands/µL Final    Lymphocytes Absolute 04/12/2018 4 03  0 60 - 4 47 Thousands/µL Final    Monocytes Absolute 04/12/2018 0 63  0 17 - 1 22 Thousand/µL Final    Eosinophils Absolute 04/12/2018 0 37  0 00 - 0 61 Thousand/µL Final    Basophils Absolute 04/12/2018 0 06  0 00 - 0 10 Thousands/µL Final         Analia Garcia MD

## 2018-05-16 NOTE — ASSESSMENT & PLAN NOTE
Last Lipid Panel:  Lab Results   Component Value Date    CHOL 183 04/12/2018    HDL 69 (H) 04/12/2018    LDLCALC 99 04/12/2018    TRIG 73 04/12/2018     Normal FLP without stain, do not restart statin

## 2018-06-02 ENCOUNTER — APPOINTMENT (OUTPATIENT)
Dept: LAB | Facility: MEDICAL CENTER | Age: 63
End: 2018-06-02
Payer: COMMERCIAL

## 2018-06-02 DIAGNOSIS — E03.9 ACQUIRED HYPOTHYROIDISM: ICD-10-CM

## 2018-06-02 DIAGNOSIS — Z12.11 SCREENING FOR COLON CANCER: ICD-10-CM

## 2018-06-02 LAB
HEMOCCULT STL QL IA: POSITIVE
T4 FREE SERPL-MCNC: 1.58 NG/DL (ref 0.76–1.46)
TSH SERPL DL<=0.05 MIU/L-ACNC: 0.09 UIU/ML (ref 0.36–3.74)

## 2018-06-02 PROCEDURE — G0328 FECAL BLOOD SCRN IMMUNOASSAY: HCPCS

## 2018-06-02 PROCEDURE — 36415 COLL VENOUS BLD VENIPUNCTURE: CPT

## 2018-06-02 PROCEDURE — 84443 ASSAY THYROID STIM HORMONE: CPT

## 2018-06-02 PROCEDURE — 84439 ASSAY OF FREE THYROXINE: CPT

## 2018-06-03 ENCOUNTER — TELEPHONE (OUTPATIENT)
Dept: FAMILY MEDICINE CLINIC | Facility: CLINIC | Age: 63
End: 2018-06-03

## 2018-06-03 DIAGNOSIS — E03.9 ACQUIRED HYPOTHYROIDISM: ICD-10-CM

## 2018-06-03 RX ORDER — LEVOTHYROXINE SODIUM 0.12 MG/1
125 TABLET ORAL DAILY
Qty: 30 TABLET | Refills: 1 | Status: SHIPPED | OUTPATIENT
Start: 2018-06-03 | End: 2018-07-02 | Stop reason: SDUPTHER

## 2018-06-03 NOTE — TELEPHONE ENCOUNTER
Please call Bro Landon and let her know that her labs showed:  1  Her thyroid is now just slightly on the high side, I am changing her Synthroid dose to , and I want her to recheck the TSH in 6 weeks  2  Her FIT test showed that there is blood in her stool  At this point she has to get a colonoscopy done to make sure the blood isn't coming from a colon cancer  There is a GI referral in the records from before, you can have her call to reschedule with them

## 2018-06-04 NOTE — TELEPHONE ENCOUNTER
Patient called and received message   I will mail her TSH order, she is calling back with a new pharmacy to send the new dose of thyroid med to, also she will call GI

## 2018-07-02 DIAGNOSIS — E03.9 ACQUIRED HYPOTHYROIDISM: ICD-10-CM

## 2018-07-02 RX ORDER — LEVOTHYROXINE SODIUM 112 UG/1
112 TABLET ORAL DAILY
Qty: 30 TABLET | Refills: 0 | Status: SHIPPED | OUTPATIENT
Start: 2018-07-02 | End: 2018-08-02 | Stop reason: SDUPTHER

## 2018-08-01 ENCOUNTER — TELEPHONE (OUTPATIENT)
Dept: FAMILY MEDICINE CLINIC | Facility: CLINIC | Age: 63
End: 2018-08-01

## 2018-08-01 ENCOUNTER — APPOINTMENT (OUTPATIENT)
Dept: LAB | Facility: MEDICAL CENTER | Age: 63
End: 2018-08-01
Payer: MEDICARE

## 2018-08-01 DIAGNOSIS — E03.9 ACQUIRED HYPOTHYROIDISM: Primary | ICD-10-CM

## 2018-08-01 DIAGNOSIS — E03.9 ACQUIRED HYPOTHYROIDISM: ICD-10-CM

## 2018-08-01 LAB
T4 FREE SERPL-MCNC: 1.58 NG/DL (ref 0.76–1.46)
TSH SERPL DL<=0.05 MIU/L-ACNC: 0.06 UIU/ML (ref 0.36–3.74)

## 2018-08-01 PROCEDURE — 36415 COLL VENOUS BLD VENIPUNCTURE: CPT

## 2018-08-01 PROCEDURE — 84439 ASSAY OF FREE THYROXINE: CPT

## 2018-08-01 PROCEDURE — 84443 ASSAY THYROID STIM HORMONE: CPT

## 2018-08-01 NOTE — TELEPHONE ENCOUNTER
Please call Claudiaash Woods and let her know that her labs showed that her thyroid is still too high since we decreased the dose from 137 to 112  Please confirm that she is taking the 112mcg dose and if she is I will decrease it further and repeat an Ultrasound of her thyroid to make sure why her thyroid numbers aren't changing if she indeed has started the lower dose  I will put in those orders once we can confirm it  Thank you! Appointment on 08/01/2018   Component Date Value Ref Range Status    TSH 3RD GENERATON 08/01/2018 0 059* 0 358 - 3 740 uIU/mL Final    Free T4 08/01/2018 1 58* 0 76 - 1 46 ng/dL Final      Specimen collection should occur prior to Sulfasalazine administration due to the potential for falsely elevated results

## 2018-08-02 RX ORDER — LEVOTHYROXINE SODIUM 88 UG/1
88 TABLET ORAL DAILY
Qty: 30 TABLET | Refills: 1 | Status: SHIPPED | OUTPATIENT
Start: 2018-08-02 | End: 2018-08-21 | Stop reason: SDUPTHER

## 2018-08-02 NOTE — TELEPHONE ENCOUNTER
Patient called back and received message  She is taking 112 mcg daily  She is willing to do Ultrasound  She will be out of area between 08/28 and 09/20, so she needs to get it done before then   Please send new dosing of levothyroxine to [facility]

## 2018-08-02 NOTE — TELEPHONE ENCOUNTER
I am changing the dose to 88mcg daily  I ordered the thyroid ultrasound given her history of "lobulated thyroid" in 2012  I want her to recheck her labwork in 6 weeks and I'll adjust at that time    Thanks

## 2018-08-09 ENCOUNTER — HOSPITAL ENCOUNTER (OUTPATIENT)
Dept: RADIOLOGY | Facility: MEDICAL CENTER | Age: 63
Discharge: HOME | End: 2018-08-09
Payer: MEDICARE

## 2018-08-09 DIAGNOSIS — E03.9 ACQUIRED HYPOTHYROIDISM: ICD-10-CM

## 2018-08-09 PROCEDURE — 76536 US EXAM OF HEAD AND NECK: CPT

## 2018-08-11 ENCOUNTER — TELEPHONE (OUTPATIENT)
Dept: FAMILY MEDICINE CLINIC | Facility: CLINIC | Age: 63
End: 2018-08-11

## 2018-08-11 DIAGNOSIS — E06.3 CHRONIC AUTOIMMUNE THYROIDITIS: Primary | ICD-10-CM

## 2018-08-12 NOTE — TELEPHONE ENCOUNTER
Please call Jayleen Romano and let her know that I reviewed his recent test which showed that her thyroid is atrophic and could have chronic autoimmune thyroiditis  I want her to get the blood work done as we discussed and I would like her to see an Endocrinologist, she can schedule it for when she comes back in September  Let me know once she is aware and I will place the referral     Thank you! Procedure: Us Thyroid    Result Date: 8/10/2018  Narrative: THYROID ULTRASOUND INDICATION:    E03 9: Hypothyroidism, unspecified  COMPARISON:  None TECHNIQUE:   Ultrasound of the thyroid was performed with a high frequency linear transducer in transverse and sagittal planes including volumetric imaging sweeps as well as traditional still imaging technique  FINDINGS:  Thyroid parenchyma is markedly heterogeneous in echotexture  Right lobe:  0 8 x 0 9 x 2 5 cm  Left lobe:  0 9 x 0 7 x 2 2 cm  Isthmus:  0 1 cm  No thyroid nodules are demonstrated  Impression: Atrophic and markedly heterogeneous thyroid in keeping with chronic autoimmune thyroiditis   Workstation performed: [id number]

## 2018-08-13 PROBLEM — E06.3 CHRONIC AUTOIMMUNE THYROIDITIS: Status: ACTIVE | Noted: 2018-04-10

## 2018-08-13 NOTE — TELEPHONE ENCOUNTER
Patient called and received message, she is agreeable to Endo, please place referral  Did encourage patient to get labs done

## 2018-08-21 ENCOUNTER — OFFICE VISIT (OUTPATIENT)
Dept: FAMILY MEDICINE CLINIC | Facility: CLINIC | Age: 63
End: 2018-08-21
Payer: MEDICARE

## 2018-08-21 VITALS
DIASTOLIC BLOOD PRESSURE: 82 MMHG | HEART RATE: 68 BPM | RESPIRATION RATE: 16 BRPM | TEMPERATURE: 97.2 F | BODY MASS INDEX: 30.51 KG/M2 | WEIGHT: 189 LBS | SYSTOLIC BLOOD PRESSURE: 110 MMHG

## 2018-08-21 DIAGNOSIS — E55.9 VITAMIN D DEFICIENCY: ICD-10-CM

## 2018-08-21 DIAGNOSIS — Z72.0 TOBACCO USE: ICD-10-CM

## 2018-08-21 DIAGNOSIS — E03.9 ACQUIRED HYPOTHYROIDISM: ICD-10-CM

## 2018-08-21 DIAGNOSIS — E06.3 CHRONIC AUTOIMMUNE THYROIDITIS: Primary | ICD-10-CM

## 2018-08-21 DIAGNOSIS — L90.0 LICHEN SCLEROSUS: ICD-10-CM

## 2018-08-21 DIAGNOSIS — H35.711: ICD-10-CM

## 2018-08-21 PROCEDURE — 99214 OFFICE O/P EST MOD 30 MIN: CPT | Performed by: FAMILY MEDICINE

## 2018-08-21 RX ORDER — MELATONIN
2000 DAILY
Qty: 60 TABLET | Refills: 0
Start: 2018-08-21 | End: 2019-02-12 | Stop reason: SDUPTHER

## 2018-08-21 RX ORDER — VIT A/VIT C/VIT E/ZINC/COPPER 2148-113
1 TABLET ORAL DAILY
Qty: 90 TABLET | Refills: 1 | Status: SHIPPED | OUTPATIENT
Start: 2018-08-21 | End: 2018-11-08 | Stop reason: ALTCHOICE

## 2018-08-21 RX ORDER — LEVOTHYROXINE SODIUM 88 UG/1
88 TABLET ORAL DAILY
Qty: 30 TABLET | Refills: 0 | Status: SHIPPED | OUTPATIENT
Start: 2018-08-21 | End: 2018-11-08 | Stop reason: ALTCHOICE

## 2018-08-21 NOTE — ASSESSMENT & PLAN NOTE
Thyroid US on 5/16/12 showed lobulated thyroid, repeat thyroid US on 8/10/18 showed atrophic and heterogenous thyroid consistent with chronic autoimmune thyroiditis    TSH in April 2018 was 25, at that time Synthroid was restarted at higher dose of 137mcg, then TSH was suppressed at 0 087 in June 2018, so dose was decreased to 125, then in August 2018 it was persistently suppressed at 0 059, so was decreased to 88mcg 2 weeks ago  Pt unaware of any h/o autoimmune conditions  -Continue 88mcg for now (only been on it for 2 weeks), can send refill to [facility] after returning from vacation and check thyroid Ab at that time

## 2018-08-21 NOTE — PROGRESS NOTES
FAMILY MEDICINE PROGRESS NOTE  Marnie Gil 70 y o  female   DATE: August 21, 2018     ASSESSMENT and PLAN:  Marnie Gil is a 70 y o  female with:     Chronic autoimmune thyroiditis  Thyroid US on 5/16/12 showed lobulated thyroid, repeat thyroid US on 8/10/18 showed atrophic and heterogenous thyroid consistent with chronic autoimmune thyroiditis  TSH in April 2018 was 25, at that time Synthroid was restarted at higher dose of 137mcg, then TSH was suppressed at 0 087 in June 2018, so dose was decreased to 125, then in August 2018 it was persistently suppressed at 0 059, so was decreased to 88mcg 2 weeks ago  Pt unaware of any h/o autoimmune conditions  -Continue 88mcg for now (only been on it for 2 weeks), can send refill to [facility] after returning from vacation and check thyroid Ab at that time    Lichen sclerosus    Patient had been seen dermatology and diagnosed her with lichen sclerosus of the vagina, uses Lidex p r n , refill provided today  Given her other dermatologic concerns including basal cell carcinoma, will refer to establish care with a dermatologist in the area  Vitamin D deficiency  Vitamin-D level was 14 8 at diagnosis in April, patient finished 8 week course of high-dose vitamin-D 50,000 international units     -Continue daily vitamin D 2000 international units    Central serous choroidopathy of right eye  Uses AREDS2 Rx, wants to see if it can be prescribed so it will be covered by her insurance    Tobacco use  Discussed with patient the risks of continued smoking  Is smoking 1 cig/day  Discussed current use pattern  I advised patient to quit, and offered support  Pt expressed understanding, is contemplative at this time, her friend quit smoking with Chantix, so wants to try that after talking to her friend in [place] and will RTC in 1 month to discuss it with me  Asked patient to inform me when she sets a quit date   Offered assistance for whenever she is ready to quit  I spent approximately 3 minutes counseling the patient  SUBJECTIVE:  Charlotte Glover is a 70 y o  female who presents today with a chief complaint of Follow-up (Thyroid issues)  She is here for follow-up of her thyroid issues and feels very fatigued since the changes in her thyroid  She sleeps a lot and is exhausted all the time, feels her eyes are puffy  She has been compliant with her Synthroid 88mcg  She has an appt with Endo on October 4th  She is going on vacation to [place] next week and concerned about her meds  Also is interested in quitting smoking! She is visiting a friend in [place] who quit with Chantix and was smoking much more than she was, so will discuss with her, but wants to try it herself  Hasnt seen Derm yet, needs referral reprinted  Needs clarification on Vit D dose  Review of Systems   Constitutional: Positive for fatigue  See HPI for ROS   Eyes: Negative for redness  I have reviewed the patient's PMH, Social History, Medication List and Allergies as appropriate  OBJECTIVE:  /82   Pulse 68   Temp (!) 97 2 °F (36 2 °C)   Resp 16   Wt 85 7 kg (189 lb)   BMI 30 51 kg/m²    Physical Exam   Constitutional: She appears well-developed and well-nourished  No distress  Neck: Normal range of motion  Neck supple  No thyromegaly present  Cardiovascular: Normal rate, regular rhythm and normal heart sounds  Pulmonary/Chest: Effort normal and breath sounds normal  No respiratory distress  She has no wheezes  She has no rales  Skin: She is not diaphoretic  Vitals reviewed  Procedure: Us Thyroid    Result Date: 8/10/2018  Narrative: THYROID ULTRASOUND INDICATION:    E03 9: Hypothyroidism, unspecified  COMPARISON:  None TECHNIQUE:   Ultrasound of the thyroid was performed with a high frequency linear transducer in transverse and sagittal planes including volumetric imaging sweeps as well as traditional still imaging technique  FINDINGS:  Thyroid parenchyma is markedly heterogeneous in echotexture  Right lobe:  0 8 x 0 9 x 2 5 cm  Left lobe:  0 9 x 0 7 x 2 2 cm  Isthmus:  0 1 cm  No thyroid nodules are demonstrated  Impression: Atrophic and markedly heterogeneous thyroid in keeping with chronic autoimmune thyroiditis  Workstation performed: [id number]        Ref  Range 4/12/2018 09:36 6/2/2018 08:27 8/1/2018 10:48   TSH 3RD GENERATON Latest Ref Range: 0 358 - 3 740 uIU/mL 25 000 (H) 0 087 (L) 0 059 (L)   Free T4 Latest Ref Range: 0 76 - 1 46 ng/dL 0 92 1 58 (H) 1 58 (H)       Analia Kramer MD    Note: Portions of the record may have been created with voice recognition software  Occasional wrong word or "sound a like" substitutions may have occurred due to the inherent limitations of voice recognition software  Read the chart carefully and recognize, using context, where substitutions have occurred

## 2018-08-21 NOTE — ASSESSMENT & PLAN NOTE
Vitamin-D level was 14 8 at diagnosis in April, patient finished 8 week course of high-dose vitamin-D 50,000 international units     -Continue daily vitamin D 2000 international units

## 2018-08-21 NOTE — ASSESSMENT & PLAN NOTE
Discussed with patient the risks of continued smoking  Is smoking 1 cig/day  Discussed current use pattern  I advised patient to quit, and offered support  Pt expressed understanding, is contemplative at this time, her friend quit smoking with Chantix, so wants to try that after talking to her friend in [place] and will RTC in 1 month to discuss it with me  Asked patient to inform me when she sets a quit date  Offered assistance for whenever she is ready to quit  I spent approximately 3 minutes counseling the patient

## 2018-10-19 ENCOUNTER — LAB (OUTPATIENT)
Dept: LAB | Facility: MEDICAL CENTER | Age: 63
End: 2018-10-19
Payer: MEDICARE

## 2018-10-19 ENCOUNTER — TRANSCRIBE ORDERS (OUTPATIENT)
Dept: LAB | Facility: MEDICAL CENTER | Age: 63
End: 2018-10-19

## 2018-10-19 DIAGNOSIS — E06.3 CHRONIC AUTOIMMUNE THYROIDITIS: ICD-10-CM

## 2018-10-19 DIAGNOSIS — E03.9 HYPOTHYROIDISM, ADULT: Primary | ICD-10-CM

## 2018-10-19 DIAGNOSIS — E03.9 ACQUIRED HYPOTHYROIDISM: ICD-10-CM

## 2018-10-19 LAB
T4 FREE SERPL-MCNC: 1.05 NG/DL (ref 0.76–1.46)
TSH SERPL DL<=0.05 MIU/L-ACNC: 12.6 UIU/ML (ref 0.36–3.74)

## 2018-10-19 PROCEDURE — 86800 THYROGLOBULIN ANTIBODY: CPT

## 2018-10-19 PROCEDURE — 86376 MICROSOMAL ANTIBODY EACH: CPT

## 2018-10-19 PROCEDURE — 84443 ASSAY THYROID STIM HORMONE: CPT

## 2018-10-19 PROCEDURE — 84439 ASSAY OF FREE THYROXINE: CPT

## 2018-10-19 PROCEDURE — 36415 COLL VENOUS BLD VENIPUNCTURE: CPT

## 2018-10-20 LAB
THYROGLOB AB SERPL-ACNC: <1 IU/ML (ref 0–0.9)
THYROPEROXIDASE AB SERPL-ACNC: 40 IU/ML (ref 0–34)

## 2018-10-21 NOTE — PROGRESS NOTES
Your thyroid tests show that you may have an autoimmune thyroid condition, please follow-up with your Endocrinologist as scheduled  If you have any questions or concerns, please call the office at [phone] or schedule a follow-up appointment

## 2018-10-22 ENCOUNTER — OFFICE VISIT (OUTPATIENT)
Dept: ENDOCRINOLOGY | Facility: CLINIC | Age: 63
End: 2018-10-22
Payer: MEDICARE

## 2018-10-22 VITALS
SYSTOLIC BLOOD PRESSURE: 110 MMHG | DIASTOLIC BLOOD PRESSURE: 90 MMHG | HEIGHT: 66 IN | WEIGHT: 199 LBS | HEART RATE: 74 BPM | BODY MASS INDEX: 31.98 KG/M2

## 2018-10-22 DIAGNOSIS — R53.83 OTHER FATIGUE: ICD-10-CM

## 2018-10-22 DIAGNOSIS — E61.1 IRON DEFICIENCY: ICD-10-CM

## 2018-10-22 DIAGNOSIS — E03.9 ACQUIRED HYPOTHYROIDISM: ICD-10-CM

## 2018-10-22 DIAGNOSIS — E06.3 CHRONIC AUTOIMMUNE THYROIDITIS: Primary | ICD-10-CM

## 2018-10-22 DIAGNOSIS — E55.9 VITAMIN D DEFICIENCY: ICD-10-CM

## 2018-10-22 PROCEDURE — 99204 OFFICE O/P NEW MOD 45 MIN: CPT | Performed by: INTERNAL MEDICINE

## 2018-10-22 RX ORDER — MELATONIN
1000 DAILY
COMMUNITY
End: 2018-11-08 | Stop reason: ALTCHOICE

## 2018-10-22 RX ORDER — LEVOTHYROXINE SODIUM 0.1 MG/1
100 TABLET ORAL DAILY
Qty: 30 TABLET | Refills: 2 | Status: SHIPPED | OUTPATIENT
Start: 2018-10-22 | End: 2019-01-24 | Stop reason: SDUPTHER

## 2018-10-22 NOTE — PROGRESS NOTES
Consultation - Katy Gomez 70 y o  female MRN: [id number]    Unit/Bed#:  Encounter: [id number]    Assessment and Plan: This is a 70y o -year-old female with past medical history of vitamin-D deficiency and Hashimoto's with hypothyroidism  Plan:  1  Hypothyroidism  -due to Hashimoto's as indicated by positive thyroid antibodies as well as thyroid ultrasound showing chronic autoimmune thyroiditis  -advise patient to separate levothyroxine from calcium and vitamin-D supplements for at least 2-4 hours   -given her most recent TSH elevated to 12 6  -advised her to increase levothyroxine to 100 mcg p o  Daily  Discussed importance of appropriate administration and compliance with medication  -recheck TSH and free T4 in 6 weeks  2  Vitamin-D deficiency-  -continue with vitamin-D supplements  -check vitamin-D level in 6 weeks    3  Fatigue  -likely due to untreated hypothyroidism  -patient reports having history of iron deficiency anemia  Reviewed her previous CBC in April which was normal  -will check CBC with iron panel in 6 weeks to rule out iron deficiency anemia    Diagnoses and all orders for this visit:    Chronic autoimmune thyroiditis  -     Ambulatory referral to Endocrinology  -     TSH, 3rd generation- Lab Collect; Future  -     T4, free Lab Collect; Future    Vitamin D deficiency  -     Vitamin D 25 hydroxy; Future    Acquired hypothyroidism  -     levothyroxine 100 mcg tablet; Take 1 tablet (100 mcg total) by mouth daily    Other fatigue  -     CBC and Platelet- Lab Collect; Future  -     Iron Panel; Future    Iron deficiency   -     Iron Panel; Future    Other orders  -     cholecalciferol (VITAMIN D3) 1,000 units tablet; Take 1,000 Units by mouth daily        CC: thyroid problem    History of Present Illness  :   Ousmane Mayo is a 70y o  year old female with above mentioned   Chief Complaint   Patient presents with    Thyroid Problem    for 10 years    Patient denies any previous exisiting thyroid disorders  She reports she was in [facility] most of her life and recently moved about a year ago to [place]  She was on a stable dose of 125 mcg of levothyroxine all these years  Patient reports associated symptoms of Positive weight gain 10 lbs over 5 months, Negative cold/heat intolerance, Positive constipation,Positive dry skin, Positive fatigue and Positive changes in decreased energy level  Patients reports symptoms have gradually worsened  Currently treated with levothyroxine (Levothroid) for this problem  She was given levothyroxine of 137mcg in April 2018 after TSH was noted to be 25 and this was then decreased to 125mcg after TSH was noted to be low and then this was changed to 88 mcg PO daily after TSH continued to be low  She has now been on 88mcg PO daily since last 3 months  She reports she takes levothyroxine with calcium and vitamin-D supplements but does wait at least 30 min before meals  She denies history of head/neck/chest radiation in the past  shedenies any family history of thyroid problems  she denies exposure to iodine, weight loss or herbal supplements  Patient Active Problem List   Diagnosis    Chronic autoimmune thyroiditis    Lichen sclerosus    Vitamin D deficiency    Benign head tremor    Basal cell carcinoma (BCC) of skin of left upper extremity including shoulder    Tobacco use    Fatty liver    Class 1 obesity due to excess calories without serious comorbidity with body mass index (BMI) of 30 0 to 30 9 in adult    Polyp of colon    Central serous choroidopathy of right eye    SK (seborrheic keratosis)      History reviewed  No pertinent past medical history  Past Surgical History:   Procedure Laterality Date    CHOLECYSTECTOMY      2014    EYE SURGERY      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    HEEL SPUR SURGERY      2012    POLYPECTOMY      2012      History reviewed  No pertinent family history    Social History Substance Use Topics    Smoking status: Current Every Day Smoker    Smokeless tobacco: Never Used    Alcohol use No     No Known Allergies    Current Outpatient Prescriptions:     fluocinonide (LIDEX) 0 05 % cream, Apply topically 2 (two) times a day, Disp: 30 g, Rfl: 0    Multiple Vitamins-Minerals (MULTIPLE VITAMINS/WOMENS) tablet, Take 1 tablet by mouth daily, Disp: , Rfl:     cholecalciferol (VITAMIN D3) 1,000 units tablet, Take 2 tablets (2,000 Units total) by mouth daily for 30 days, Disp: 60 tablet, Rfl: 0    levothyroxine 88 mcg tablet, Take 1 tablet (88 mcg total) by mouth daily for 30 days, Disp: 30 tablet, Rfl: 0    Multiple Vitamins-Minerals (PRESERVISION AREDS) TABS, Take 1 tablet by mouth daily (Patient not taking: Reported on 10/22/2018 ), Disp: 90 tablet, Rfl: 1    Review of Systems   Constitutional: Positive for fatigue and unexpected weight change  Negative for fever  HENT: Positive for voice change  Negative for trouble swallowing  Eyes: Negative for visual disturbance  Respiratory: Negative for shortness of breath  Cardiovascular: Negative for chest pain  Gastrointestinal: Positive for constipation  Endocrine: Negative for cold intolerance and heat intolerance  Neurological: Positive for weakness  Psychiatric/Behavioral: Positive for confusion  All other systems reviewed and are negative  Physical Exam:  Body mass index is 32 12 kg/m²  /90   Pulse 74   Ht 5' 6" (1 676 m)   Wt 90 3 kg (199 lb)   BMI 32 12 kg/m²    Wt Readings from Last 3 Encounters:   10/22/18 90 3 kg (199 lb)   08/21/18 85 7 kg (189 lb)   05/16/18 84 4 kg (186 lb)       Physical Exam   Constitutional: She is oriented to person, place, and time  She appears well-developed and well-nourished  HENT:   Head: Normocephalic and atraumatic  Nose: Nose normal    Mouth/Throat: Oropharynx is clear and moist    Eyes: Pupils are equal, round, and reactive to light   EOM are normal     sclera non injected; no lid lag or stare   Neck: Neck supple  No thyromegaly present  Cardiovascular: Normal rate, regular rhythm, normal heart sounds and intact distal pulses  No murmur heard  Pulmonary/Chest: Effort normal and breath sounds normal    Abdominal: Soft  Bowel sounds are normal  She exhibits no distension  There is no tenderness  Musculoskeletal: Normal range of motion  She exhibits no edema  no ulcers,    Lymphadenopathy:     She has no cervical adenopathy  Neurological: She is alert and oriented to person, place, and time  normal DTRs UE and LE, no tremor   Skin: Skin is warm  No rash noted  No erythema  Psychiatric: She has a normal mood and affect  Her behavior is normal        Labs:       Lab Results   Component Value Date    CREATININE 0 74 04/12/2018    BUN 11 04/12/2018     04/12/2018    K 4 2 04/12/2018     (H) 04/12/2018    CO2 26 04/12/2018     eGFR   Date Value Ref Range Status   04/12/2018 85 ml/min/1 73sq m Final       Lab Results   Component Value Date    HDL 69 (H) 04/12/2018    TRIG 73 04/12/2018       Lab Results   Component Value Date    ALT 23 04/12/2018    AST 19 04/12/2018    ALKPHOS 80 04/12/2018       Lab Results   Component Value Date    FREET4 1 05 10/19/2018         Impression & Plan:    Problem List Items Addressed This Visit     Chronic autoimmune thyroiditis          No orders of the defined types were placed in this encounter  There are no Patient Instructions on file for this visit  Discussed with the patient and all questioned fully answered  She will call me if any problems arise  Follow-up appointment in 6 months       Counseled patient on diagnostic results, prognosis, risk and benefit of treatment options, instruction for management, importance of treatment compliance, Risk  factor reduction and impressions      Christoph Hinds MD

## 2018-10-22 NOTE — LETTER
October 22, 2018     Chloé Sears MD  [address] [place] [address]    Patient: Tres Manjarrez   YOB: 1955   Date of Visit: 10/22/2018       Dear Dr Man Sears: Thank you for referring Tres Manjarrez to me for evaluation  Below are my notes for this consultation  If you have questions, please do not hesitate to call me  I look forward to following your patient along with you  Sincerely,        Beata Quinonez MD        CC: No Recipients  Beata Quinonez MD  10/22/2018 12:19 PM  Sign at close encounter  Consultation - Tres Manjarrez 70 y o  female MRN: [id number]    Unit/Bed#:  Encounter: [id number]    Assessment and Plan: This is a 70y o -year-old female with past medical history of vitamin-D deficiency and Hashimoto's with hypothyroidism  Plan:  1  Hypothyroidism  -due to Hashimoto's as indicated by positive thyroid antibodies as well as thyroid ultrasound showing chronic autoimmune thyroiditis  -advise patient to separate levothyroxine from calcium and vitamin-D supplements for at least 2-4 hours   -given her most recent TSH elevated to 12 6  -advised her to increase levothyroxine to 100 mcg p o  Daily  Discussed importance of appropriate administration and compliance with medication  -recheck TSH and free T4 in 6 weeks  2  Vitamin-D deficiency-  -continue with vitamin-D supplements  -check vitamin-D level in 6 weeks    3  Fatigue  -likely due to untreated hypothyroidism  -patient reports having history of iron deficiency anemia  Reviewed her previous CBC in April which was normal  -will check CBC with iron panel in 6 weeks to rule out iron deficiency anemia    Diagnoses and all orders for this visit:    Chronic autoimmune thyroiditis  -     Ambulatory referral to Endocrinology  -     TSH, 3rd generation- Lab Collect; Future  -     T4, free Lab Collect; Future    Vitamin D deficiency  -     Vitamin D 25 hydroxy;  Future    Acquired hypothyroidism  - levothyroxine 100 mcg tablet; Take 1 tablet (100 mcg total) by mouth daily    Other fatigue  -     CBC and Platelet- Lab Collect; Future  -     Iron Panel; Future    Iron deficiency   -     Iron Panel; Future    Other orders  -     cholecalciferol (VITAMIN D3) 1,000 units tablet; Take 1,000 Units by mouth daily        CC: thyroid problem    History of Present Illness  :   Sunil Danielson is a 70y o  year old female with above mentioned   Chief Complaint   Patient presents with    Thyroid Problem    for 10 years  Patient denies any previous exisiting thyroid disorders  She reports she was in [facility] most of her life and recently moved about a year ago to [place]  She was on a stable dose of 125 mcg of levothyroxine all these years  Patient reports associated symptoms of Positive weight gain 10 lbs over 5 months, Negative cold/heat intolerance, Positive constipation,Positive dry skin, Positive fatigue and Positive changes in decreased energy level  Patients reports symptoms have gradually worsened  Currently treated with levothyroxine (Levothroid) for this problem  She was given levothyroxine of 137mcg in April 2018 after TSH was noted to be 25 and this was then decreased to 125mcg after TSH was noted to be low and then this was changed to 88 mcg PO daily after TSH continued to be low  She has now been on 88mcg PO daily since last 3 months  She reports she takes levothyroxine with calcium and vitamin-D supplements but does wait at least 30 min before meals  She denies history of head/neck/chest radiation in the past  shedenies any family history of thyroid problems  she denies exposure to iodine, weight loss or herbal supplements        Patient Active Problem List   Diagnosis    Chronic autoimmune thyroiditis    Lichen sclerosus    Vitamin D deficiency    Benign head tremor    Basal cell carcinoma (BCC) of skin of left upper extremity including shoulder    Tobacco use    Fatty liver    Class 1 obesity due to excess calories without serious comorbidity with body mass index (BMI) of 30 0 to 30 9 in adult    Polyp of colon    Central serous choroidopathy of right eye    SK (seborrheic keratosis)      History reviewed  No pertinent past medical history  Past Surgical History:   Procedure Laterality Date    CHOLECYSTECTOMY      2014    EYE SURGERY      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    HEEL SPUR SURGERY      2012    POLYPECTOMY      2012      History reviewed  No pertinent family history  Social History   Substance Use Topics    Smoking status: Current Every Day Smoker    Smokeless tobacco: Never Used    Alcohol use No     No Known Allergies    Current Outpatient Prescriptions:     fluocinonide (LIDEX) 0 05 % cream, Apply topically 2 (two) times a day, Disp: 30 g, Rfl: 0    Multiple Vitamins-Minerals (MULTIPLE VITAMINS/WOMENS) tablet, Take 1 tablet by mouth daily, Disp: , Rfl:     cholecalciferol (VITAMIN D3) 1,000 units tablet, Take 2 tablets (2,000 Units total) by mouth daily for 30 days, Disp: 60 tablet, Rfl: 0    levothyroxine 88 mcg tablet, Take 1 tablet (88 mcg total) by mouth daily for 30 days, Disp: 30 tablet, Rfl: 0    Multiple Vitamins-Minerals (PRESERVISION AREDS) TABS, Take 1 tablet by mouth daily (Patient not taking: Reported on 10/22/2018 ), Disp: 90 tablet, Rfl: 1    Review of Systems   Constitutional: Positive for fatigue and unexpected weight change  Negative for fever  HENT: Positive for voice change  Negative for trouble swallowing  Eyes: Negative for visual disturbance  Respiratory: Negative for shortness of breath  Cardiovascular: Negative for chest pain  Gastrointestinal: Positive for constipation  Endocrine: Negative for cold intolerance and heat intolerance  Neurological: Positive for weakness  Psychiatric/Behavioral: Positive for confusion  All other systems reviewed and are negative        Physical Exam:  Body mass index is 32 12 kg/m²  /90   Pulse 74   Ht 5' 6" (1 676 m)   Wt 90 3 kg (199 lb)   BMI 32 12 kg/m²     Wt Readings from Last 3 Encounters:   10/22/18 90 3 kg (199 lb)   08/21/18 85 7 kg (189 lb)   05/16/18 84 4 kg (186 lb)       Physical Exam   Constitutional: She is oriented to person, place, and time  She appears well-developed and well-nourished  HENT:   Head: Normocephalic and atraumatic  Nose: Nose normal    Mouth/Throat: Oropharynx is clear and moist    Eyes: Pupils are equal, round, and reactive to light  EOM are normal     sclera non injected; no lid lag or stare   Neck: Neck supple  No thyromegaly present  Cardiovascular: Normal rate, regular rhythm, normal heart sounds and intact distal pulses  No murmur heard  Pulmonary/Chest: Effort normal and breath sounds normal    Abdominal: Soft  Bowel sounds are normal  She exhibits no distension  There is no tenderness  Musculoskeletal: Normal range of motion  She exhibits no edema  no ulcers,    Lymphadenopathy:     She has no cervical adenopathy  Neurological: She is alert and oriented to person, place, and time  normal DTRs UE and LE, no tremor   Skin: Skin is warm  No rash noted  No erythema  Psychiatric: She has a normal mood and affect   Her behavior is normal        Labs:       Lab Results   Component Value Date    CREATININE 0 74 04/12/2018    BUN 11 04/12/2018     04/12/2018    K 4 2 04/12/2018     (H) 04/12/2018    CO2 26 04/12/2018     eGFR   Date Value Ref Range Status   04/12/2018 85 ml/min/1 73sq m Final       Lab Results   Component Value Date    HDL 69 (H) 04/12/2018    TRIG 73 04/12/2018       Lab Results   Component Value Date    ALT 23 04/12/2018    AST 19 04/12/2018    ALKPHOS 80 04/12/2018       Lab Results   Component Value Date    FREET4 1 05 10/19/2018         Impression & Plan:    Problem List Items Addressed This Visit     Chronic autoimmune thyroiditis          No orders of the defined types were placed in this encounter  There are no Patient Instructions on file for this visit  Discussed with the patient and all questioned fully answered  She will call me if any problems arise  Follow-up appointment in 6 months       Counseled patient on diagnostic results, prognosis, risk and benefit of treatment options, instruction for management, importance of treatment compliance, Risk  factor reduction and impressions      Prashanth Minor MD

## 2018-10-23 ENCOUNTER — TELEPHONE (OUTPATIENT)
Dept: FAMILY MEDICINE CLINIC | Facility: CLINIC | Age: 63
End: 2018-10-23

## 2018-10-23 NOTE — TELEPHONE ENCOUNTER
Patient tried to schedule for an appointment with Dr Jennifer Jeffery for a basal cell carcinoma and the first available would be April, 2019 unless Dr Azra Brown or nurse called to expedite the appointment  Please call      Jose Corcoran [phone]

## 2018-10-25 NOTE — TELEPHONE ENCOUNTER
Spoke with Elham Irby at Dr Simonne Goldmann office, stated that all new appointments are starting in April 2019  Patient's insurance was reviewed and they do not participate in [place] and patient will be responsible for any expenses not covered by Medicare   Patient informed and will call insurance for participating dermatologist

## 2018-11-08 ENCOUNTER — OFFICE VISIT (OUTPATIENT)
Dept: OBGYN CLINIC | Facility: CLINIC | Age: 63
End: 2018-11-08
Payer: MEDICARE

## 2018-11-08 VITALS
DIASTOLIC BLOOD PRESSURE: 90 MMHG | BODY MASS INDEX: 31.18 KG/M2 | WEIGHT: 194 LBS | HEIGHT: 66 IN | SYSTOLIC BLOOD PRESSURE: 142 MMHG

## 2018-11-08 DIAGNOSIS — Z12.31 ENCOUNTER FOR SCREENING MAMMOGRAM FOR MALIGNANT NEOPLASM OF BREAST: ICD-10-CM

## 2018-11-08 DIAGNOSIS — Z13.820 SCREENING FOR OSTEOPOROSIS: ICD-10-CM

## 2018-11-08 DIAGNOSIS — L90.0 LICHEN SCLEROSUS: ICD-10-CM

## 2018-11-08 DIAGNOSIS — Z01.419 WELL FEMALE EXAM WITH ROUTINE GYNECOLOGICAL EXAM: Primary | ICD-10-CM

## 2018-11-08 DIAGNOSIS — Z12.4 SCREENING FOR MALIGNANT NEOPLASM OF CERVIX: ICD-10-CM

## 2018-11-08 PROCEDURE — G0145 SCR C/V CYTO,THINLAYER,RESCR: HCPCS | Performed by: OBSTETRICS & GYNECOLOGY

## 2018-11-08 PROCEDURE — G0101 CA SCREEN;PELVIC/BREAST EXAM: HCPCS | Performed by: OBSTETRICS & GYNECOLOGY

## 2018-11-08 PROCEDURE — 87624 HPV HI-RISK TYP POOLED RSLT: CPT | Performed by: OBSTETRICS & GYNECOLOGY

## 2018-11-08 NOTE — PROGRESS NOTES
ASSESSMENT & PLAN: Lilia Tejada is a 70 y o   with normal gynecologic exam     1   Routine well woman exam done today  2  Pap and HPV: Current ASCCP Guidelines reviewed - Pap collected, if normal, no further paps   3  Mammogram ordered, yearly mammography recommended  4   Colonoscopy recommended per guidelines  5   DEXA scan is due, order placed  6  The following were reviewed in today's visit: breast self exam, mammography screening ordered, menopause, osteoporosis, adequate intake of calcium and vitamin D, exercise, healthy diet and DEXA ordered  7  Lichen sclerosis - maintained on fluocinonide - recommend use 1-2/week  Patient to return to office in 12 months for annual    CC: Annual Gynecologic Examination    HPI: Lliia Tejada is a 70 y o  [id number] who presents for annual gynecologic examination  She has the following concerns:  Lichen sclerosis    Health Maintenance:    She exercises 2 days per week  She wears her seatbelt routinely  She does not perform regular monthly self breast exams  She feels safe at home  Patients does follow a healthy diet      Last mammogram: [unfilled]  Last colonoscopy: [unfilled]    Past Medical History:   Diagnosis Date    Basal cell carcinoma 2016    Colon polyp     Fatty liver     Hypothyroidism     Lichen sclerosus     Miscarriage     Obesity     Sebaceous cyst 2012    of Scalp     Stage 2 chronic kidney disease     Vitamin D deficiency        Past Surgical History:   Procedure Laterality Date    CHOLECYSTECTOMY      2014    EYE SURGERY      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    HEEL SPUR SURGERY      2012    MALIGNANT SKIN LESION EXCISION  2016    POLYPECTOMY      2012    PROSTHODONTIC PROCEDURE      TOOTH EXTRACTION         Past OB/Gyn History:  OB History      Para Term  AB Living    7 6 6   1 6    SAB TAB Ectopic Multiple Live Births    1       6        Menstrual history: Patient is post menopausal    History of abnormal pap smears  No     Patient is not currently sexually active  heterosexual       Family History   Problem Relation Age of Onset    No Known Problems Mother     No Known Problems Father     No Known Problems Brother     No Known Problems Daughter     No Known Problems Son     No Known Problems Maternal Grandmother     No Known Problems Maternal Grandfather     No Known Problems Paternal Grandmother     No Known Problems Paternal Grandfather     No Known Problems Daughter     No Known Problems Daughter     Congenital heart disease Daughter     SIDS Daughter        Social History:  Social History     Social History    Marital status:      Spouse name: N/A    Number of children: N/A    Years of education: N/A     Occupational History    Not on file  Social History Main Topics    Smoking status: Current Every Day Smoker     Packs/day: 0 25     Types: Cigarettes    Smokeless tobacco: Never Used    Alcohol use No    Drug use: No    Sexual activity: Not Currently     Partners: Male     Birth control/ protection: None, Post-menopausal     Other Topics Concern    Not on file     Social History Narrative    No narrative on file     Presently lives with her aunt  Patient is single  Patient is currently retired    No Known Allergies      Current Outpatient Prescriptions:     B Complex Vitamins (B COMPLEX PO), Take by mouth, Disp: , Rfl:     fluocinonide (LIDEX) 0 05 % cream, Apply topically 2 (two) times a week, Disp: 30 g, Rfl: 6    levothyroxine 100 mcg tablet, Take 1 tablet (100 mcg total) by mouth daily, Disp: 30 tablet, Rfl: 2    Multiple Vitamins-Minerals (MULTIPLE VITAMINS/WOMENS) tablet, Take 1 tablet by mouth daily, Disp: , Rfl:     cholecalciferol (VITAMIN D3) 1,000 units tablet, Take 2 tablets (2,000 Units total) by mouth daily for 30 days, Disp: 60 tablet, Rfl: 0    Review of Systems:  Review of Systems   Constitutional: Negative      HENT: Negative  Respiratory: Negative  Cardiovascular: Negative  Gastrointestinal: Negative  Genitourinary: Negative  Neurological: Negative  Psychiatric/Behavioral: Negative  Physical Exam:  /90   Ht 5' 5 5" (1 664 m)   Wt 88 kg (194 lb)   LMP  (Approximate) Comment: 2008  Breastfeeding? No   BMI 31 79 kg/m²    Physical Exam   Constitutional: She is oriented to person, place, and time  She appears well-developed and well-nourished  Genitourinary: Vagina normal and uterus normal  There is no tenderness or lesion on the right labia  There is no tenderness or lesion on the left labia  Vagina exhibits no rugosity  No tenderness or bleeding in the vagina  No vaginal discharge found  Right adnexum does not display mass and does not display tenderness  Left adnexum does not display mass and does not display tenderness  Cervix is parous  Cervix does not exhibit motion tenderness or discharge  Uterus is mobile  Uterus is not enlarged or tender  Genitourinary Comments: Mild perineal erythema   HENT:   Head: Normocephalic and atraumatic  Cardiovascular: Normal rate, regular rhythm and normal heart sounds  Pulmonary/Chest: Effort normal and breath sounds normal  No respiratory distress  Right breast exhibits no mass, no nipple discharge, no skin change and no tenderness  Left breast exhibits no mass, no nipple discharge, no skin change and no tenderness  Abdominal: Soft  She exhibits no distension  There is no tenderness  Neurological: She is alert and oriented to person, place, and time  Skin: Skin is warm and dry  Nursing note and vitals reviewed

## 2018-11-13 ENCOUNTER — TELEPHONE (OUTPATIENT)
Dept: OBGYN CLINIC | Facility: CLINIC | Age: 63
End: 2018-11-13

## 2018-11-13 LAB
HPV HR 12 DNA CVX QL NAA+PROBE: NEGATIVE
HPV16 DNA CVX QL NAA+PROBE: NEGATIVE
HPV18 DNA CVX QL NAA+PROBE: NEGATIVE

## 2018-11-13 NOTE — TELEPHONE ENCOUNTER
Patient states the insurance is not covering Lidex needs prior authorization  States she likes this medication and it has worked in the past  Advised to have form faxed to office and we will fill out  Routing to provider to update

## 2018-11-19 ENCOUNTER — DOCUMENTATION (OUTPATIENT)
Dept: OBGYN CLINIC | Facility: CLINIC | Age: 63
End: 2018-11-19

## 2018-11-19 LAB
LAB AP GYN PRIMARY INTERPRETATION: NORMAL
Lab: NORMAL

## 2019-01-24 DIAGNOSIS — E03.9 ACQUIRED HYPOTHYROIDISM: ICD-10-CM

## 2019-01-24 RX ORDER — LEVOTHYROXINE SODIUM 0.1 MG/1
100 TABLET ORAL DAILY
Qty: 30 TABLET | Refills: 2 | Status: SHIPPED | OUTPATIENT
Start: 2019-01-24 | End: 2019-03-12 | Stop reason: SDUPTHER

## 2019-02-11 ENCOUNTER — APPOINTMENT (OUTPATIENT)
Dept: LAB | Facility: MEDICAL CENTER | Age: 64
End: 2019-02-11
Payer: MEDICARE

## 2019-02-11 DIAGNOSIS — E55.9 VITAMIN D DEFICIENCY: ICD-10-CM

## 2019-02-11 DIAGNOSIS — E06.3 CHRONIC AUTOIMMUNE THYROIDITIS: ICD-10-CM

## 2019-02-11 DIAGNOSIS — R53.83 OTHER FATIGUE: ICD-10-CM

## 2019-02-11 LAB
ERYTHROCYTE [DISTWIDTH] IN BLOOD BY AUTOMATED COUNT: 14.6 % (ref 11.6–15.1)
HCT VFR BLD AUTO: 43.2 % (ref 34.8–46.1)
HGB BLD-MCNC: 13.6 G/DL (ref 11.5–15.4)
MCH RBC QN AUTO: 27.5 PG (ref 26.8–34.3)
MCHC RBC AUTO-ENTMCNC: 31.5 G/DL (ref 31.4–37.4)
MCV RBC AUTO: 87 FL (ref 82–98)
PLATELET # BLD AUTO: 237 THOUSANDS/UL (ref 149–390)
PMV BLD AUTO: 10.8 FL (ref 8.9–12.7)
RBC # BLD AUTO: 4.95 MILLION/UL (ref 3.81–5.12)
WBC # BLD AUTO: 8.9 THOUSAND/UL (ref 4.31–10.16)

## 2019-02-11 PROCEDURE — 82306 VITAMIN D 25 HYDROXY: CPT

## 2019-02-11 PROCEDURE — 85027 COMPLETE CBC AUTOMATED: CPT

## 2019-02-11 PROCEDURE — 36415 COLL VENOUS BLD VENIPUNCTURE: CPT

## 2019-02-11 PROCEDURE — 84439 ASSAY OF FREE THYROXINE: CPT

## 2019-02-11 PROCEDURE — 84443 ASSAY THYROID STIM HORMONE: CPT

## 2019-02-12 DIAGNOSIS — E55.9 VITAMIN D DEFICIENCY: ICD-10-CM

## 2019-02-12 DIAGNOSIS — L90.0 LICHEN SCLEROSUS: ICD-10-CM

## 2019-02-12 LAB
25(OH)D3 SERPL-MCNC: 36.5 NG/ML (ref 30–100)
T4 FREE SERPL-MCNC: 1.27 NG/DL (ref 0.76–1.46)
TSH SERPL DL<=0.05 MIU/L-ACNC: 2.78 UIU/ML (ref 0.36–3.74)

## 2019-02-14 RX ORDER — MELATONIN
2000 DAILY
Qty: 60 TABLET | Refills: 1 | Status: SHIPPED | OUTPATIENT
Start: 2019-02-14

## 2019-02-18 ENCOUNTER — TELEPHONE (OUTPATIENT)
Dept: ENDOCRINOLOGY | Facility: CLINIC | Age: 64
End: 2019-02-18

## 2019-02-18 NOTE — TELEPHONE ENCOUNTER
----- Message from Jose Fortune PA-C sent at 2/18/2019  1:18 PM EST -----  Thyroid labs improved, Vitamin D improved, CBC normal

## 2019-02-27 ENCOUNTER — HOSPITAL ENCOUNTER (OUTPATIENT)
Dept: RADIOLOGY | Facility: MEDICAL CENTER | Age: 64
Discharge: HOME | End: 2019-02-27
Payer: MEDICARE

## 2019-02-27 VITALS — BODY MASS INDEX: 31.18 KG/M2 | HEIGHT: 66 IN | WEIGHT: 194 LBS

## 2019-02-27 DIAGNOSIS — Z12.31 ENCOUNTER FOR SCREENING MAMMOGRAM FOR MALIGNANT NEOPLASM OF BREAST: ICD-10-CM

## 2019-02-27 DIAGNOSIS — Z13.820 SCREENING FOR OSTEOPOROSIS: ICD-10-CM

## 2019-02-27 PROCEDURE — 77080 DXA BONE DENSITY AXIAL: CPT

## 2019-02-27 PROCEDURE — 77067 SCR MAMMO BI INCL CAD: CPT

## 2019-02-28 NOTE — RESULT ENCOUNTER NOTE
Next this shows low bone density in the left femoral neck  At this point continue calcium and vitamin-D supplementation

## 2019-03-12 DIAGNOSIS — E03.9 ACQUIRED HYPOTHYROIDISM: ICD-10-CM

## 2019-03-12 RX ORDER — LEVOTHYROXINE SODIUM 0.1 MG/1
100 TABLET ORAL DAILY
Qty: 30 TABLET | Refills: 0 | Status: SHIPPED | OUTPATIENT
Start: 2019-03-12 | End: 2019-04-11 | Stop reason: SDUPTHER

## 2019-03-12 RX ORDER — LEVOTHYROXINE SODIUM 0.1 MG/1
100 TABLET ORAL DAILY
Qty: 30 TABLET | Refills: 4 | Status: SHIPPED | OUTPATIENT
Start: 2019-03-12 | End: 2019-04-11 | Stop reason: SDUPTHER

## 2019-04-09 DIAGNOSIS — E03.9 ACQUIRED HYPOTHYROIDISM: ICD-10-CM

## 2019-04-09 DIAGNOSIS — L90.0 LICHEN SCLEROSUS: ICD-10-CM

## 2019-04-11 DIAGNOSIS — E03.9 ACQUIRED HYPOTHYROIDISM: ICD-10-CM

## 2019-04-11 RX ORDER — LEVOTHYROXINE SODIUM 0.1 MG/1
100 TABLET ORAL DAILY
Qty: 30 TABLET | Refills: 0 | Status: SHIPPED | OUTPATIENT
Start: 2019-04-11 | End: 2019-07-22 | Stop reason: SDUPTHER

## 2019-04-11 RX ORDER — LEVOTHYROXINE SODIUM 0.1 MG/1
100 TABLET ORAL DAILY
Qty: 30 TABLET | Refills: 4 | Status: SHIPPED | OUTPATIENT
Start: 2019-04-11 | End: 2019-07-22 | Stop reason: SDUPTHER

## 2019-07-22 ENCOUNTER — OFFICE VISIT (OUTPATIENT)
Dept: FAMILY MEDICINE CLINIC | Facility: CLINIC | Age: 64
End: 2019-07-22
Payer: MEDICARE

## 2019-07-22 VITALS
RESPIRATION RATE: 16 BRPM | BODY MASS INDEX: 31.26 KG/M2 | HEART RATE: 83 BPM | SYSTOLIC BLOOD PRESSURE: 94 MMHG | WEIGHT: 194.5 LBS | OXYGEN SATURATION: 94 % | HEIGHT: 66 IN | DIASTOLIC BLOOD PRESSURE: 78 MMHG | TEMPERATURE: 95.8 F

## 2019-07-22 DIAGNOSIS — R53.82 CHRONIC FATIGUE: ICD-10-CM

## 2019-07-22 DIAGNOSIS — Z12.11 SCREEN FOR COLON CANCER: ICD-10-CM

## 2019-07-22 DIAGNOSIS — L82.1 SK (SEBORRHEIC KERATOSIS): ICD-10-CM

## 2019-07-22 DIAGNOSIS — E06.3 CHRONIC AUTOIMMUNE THYROIDITIS: ICD-10-CM

## 2019-07-22 DIAGNOSIS — Z13.220 SCREENING FOR HYPERLIPIDEMIA: ICD-10-CM

## 2019-07-22 DIAGNOSIS — K76.0 FATTY LIVER: ICD-10-CM

## 2019-07-22 DIAGNOSIS — C44.619 BASAL CELL CARCINOMA (BCC) OF SKIN OF LEFT UPPER EXTREMITY INCLUDING SHOULDER: ICD-10-CM

## 2019-07-22 DIAGNOSIS — Z00.00 MEDICARE ANNUAL WELLNESS VISIT, SUBSEQUENT: Primary | ICD-10-CM

## 2019-07-22 DIAGNOSIS — Z72.0 TOBACCO USE: ICD-10-CM

## 2019-07-22 DIAGNOSIS — L90.0 LICHEN SCLEROSUS: ICD-10-CM

## 2019-07-22 PROCEDURE — G0439 PPPS, SUBSEQ VISIT: HCPCS | Performed by: FAMILY MEDICINE

## 2019-07-22 PROCEDURE — 99214 OFFICE O/P EST MOD 30 MIN: CPT | Performed by: FAMILY MEDICINE

## 2019-07-22 RX ORDER — LEVOTHYROXINE SODIUM 0.1 MG/1
100 TABLET ORAL DAILY
Qty: 30 TABLET | Refills: 5 | Status: SHIPPED | OUTPATIENT
Start: 2019-07-22 | End: 2019-11-11 | Stop reason: SDUPTHER

## 2019-07-22 NOTE — PROGRESS NOTES
FAMILY MEDICINE PROGRESS NOTE  Karyn Coppola 75 y o  female   DATE: July 22, 2019     ASSESSMENT and PLAN:  Karyn Coppola is a 75 y o  female with:     Tobacco use  Discussed with patient the risks of continued smoking  Discussed current use pattern- 1 pack every 2 weeks  I advised patient to quit, and offered support  Pt expressed understanding, is pre-contemplative at this time  Asked patient to inform me when she sets a quit date  Offered assistance for whenever she is ready to quit  I spent approximately 3 minutes counseling the patient  If unable to stop by herself, can send in Chantix      Chronic autoimmune thyroiditis  Lab Results   Component Value Date    [id number] 2 780 02/11/2019   Has seen Endo recently, asking our office to take over meds  Positive thyroid Ab  Controlled now on Synthroid [id number]    Lichen sclerosus  Well controlled with Lidex 0 05% PRN per Gyn, limit to 1-2x/week    Basal cell carcinoma (BCC) of skin of left upper extremity including shoulder  Was seeing Dermatology regularly, last removal was in 2016  Has multiple skin lesions removed from her face, arms, back, shoulders  -Derm referral    Fatty liver  Recheck CMP      SUBJECTIVE:  Karyn Coppola is a 75 y o  female who presents today with a chief complaint of Medicare Wellness Visit  Pt is here for f/u and MAWV  In the interim, her son was incarcerated in [place] and she is now taking care of her 11year old granddaughter and she is struggling emotionally  But she knows she needs to take care of herself, so is now willing to get her tests/screenings completed  1  Hashimotos thyroid- has seen Endo, but since her levels are stable, she doesn't want to have to see them if she doesn't need to  Is due for refills today  2  Lichen sclerosus- sees Gyn, uses Lidex 1-2x/week  3  Positive FIT test in 2018, never had a follow up colonscopy, likely due to known hemorrhoids though  4   H/o BCC of her shoulder, had a spot on her nose that she "cut off" herself, but needs to establish locally with a Dermatologist    Review of Systems   Constitutional: Positive for fatigue  Respiratory: Negative for cough and shortness of breath  Cardiovascular: Negative for chest pain and palpitations  Neurological: Negative for dizziness and light-headedness  Psychiatric/Behavioral: Positive for dysphoric mood  I have reviewed the patient's Past Medical History  OBJECTIVE:  BP 94/78   Pulse 83   Temp (!) 95 8 °F (35 4 °C)   Resp 16   Ht 5' 5 7" (1 669 m)   Wt 88 2 kg (194 lb 8 oz)   SpO2 94%   BMI 31 68 kg/m²    Physical Exam   Constitutional: She appears well-developed and well-nourished  No distress  obese   Cardiovascular: Normal rate, regular rhythm and normal heart sounds  Pulmonary/Chest: Effort normal and breath sounds normal  No respiratory distress  She has no wheezes  She has no rales  Skin: She is not diaphoretic  Vitals reviewed  [facility]  Don Kohler MD    Note: Portions of the record have been created with voice recognition software  Occasional wrong word or "sound a like" substitutions may have occurred due to the inherent limitations of voice recognition software  Read the chart carefully and recognize, using context, where substitutions have occurred

## 2019-07-22 NOTE — ASSESSMENT & PLAN NOTE
Discussed with patient the risks of continued smoking  Discussed current use pattern- 1 pack every 2 weeks  I advised patient to quit, and offered support  Pt expressed understanding, is pre-contemplative at this time  Asked patient to inform me when she sets a quit date  Offered assistance for whenever she is ready to quit  I spent approximately 3 minutes counseling the patient    If unable to stop by herself, can send in Chantix

## 2019-07-22 NOTE — ASSESSMENT & PLAN NOTE
Lab Results   Component Value Date    [id number] 2 780 02/11/2019   Has seen Endo recently, asking our office to take over meds  Positive thyroid Ab  Controlled now on Synthroid 100mcg

## 2019-07-22 NOTE — PROGRESS NOTES
Assessment and Plan:     Problem List Items Addressed This Visit        Endocrine    Chronic autoimmune thyroiditis      Other Visit Diagnoses     Medicare annual wellness visit, subsequent    -  Primary    Chronic fatigue             History of Present Illness:     Patient presents for Medicare Annual Wellness Visit     Problem List:     Patient Active Problem List   Diagnosis    Chronic autoimmune thyroiditis    Lichen sclerosus    Vitamin D deficiency    Benign head tremor    Basal cell carcinoma (BCC) of skin of left upper extremity including shoulder    Tobacco use    Fatty liver    Class 1 obesity due to excess calories without serious comorbidity with body mass index (BMI) of 30 0 to 30 9 in adult    Polyp of colon    Central serous choroidopathy of right eye    SK (seborrheic keratosis)      Past Medical and Surgical History:     Past Medical History:   Diagnosis Date    Basal cell carcinoma 2016    Colon polyp     Fatty liver     Hypothyroidism     Lichen sclerosus     Miscarriage     Obesity     Sebaceous cyst 2012    of Scalp     Stage 2 chronic kidney disease     Vitamin D deficiency      Past Surgical History:   Procedure Laterality Date    CHOLECYSTECTOMY      2014    EYE SURGERY      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    HEEL SPUR SURGERY      2012    MALIGNANT SKIN LESION EXCISION  2016    POLYPECTOMY      2012    PROSTHODONTIC PROCEDURE      TOOTH EXTRACTION        Family History:     Family History   Problem Relation Age of Onset    No Known Problems Mother     No Known Problems Father     No Known Problems Brother     No Known Problems Daughter     No Known Problems Son     No Known Problems Maternal Grandmother     No Known Problems Maternal Grandfather     No Known Problems Paternal Grandmother     No Known Problems Paternal Grandfather     No Known Problems Daughter     No Known Problems Daughter     Congenital heart disease Daughter  SIDS Daughter       Social History:     Social History     Tobacco Use   Smoking Status Light Tobacco Smoker    Packs/day: 0 25    Types: Cigarettes   Smokeless Tobacco Never Used     Social History     Substance and Sexual Activity   Alcohol Use No     Social History     Substance and Sexual Activity   Drug Use No      Medications and Allergies:     Current Outpatient Medications   Medication Sig Dispense Refill    B Complex Vitamins (B COMPLEX PO) Take by mouth      cholecalciferol (VITAMIN D3) 1,000 units tablet Take 2 tablets (2,000 Units total) by mouth daily 60 tablet 1    fluocinonide (LIDEX) 0 05 % cream Apply topically 2 (two) times a week 30 g 6    levothyroxine 100 mcg tablet Take 1 tablet (100 mcg total) by mouth daily 30 tablet 0    Multiple Vitamins-Minerals (MULTIPLE VITAMINS/WOMENS) tablet Take 1 tablet by mouth daily       No current facility-administered medications for this visit  No Known Allergies   Immunizations:     Immunization History   Administered Date(s) Administered    H1N1, All Formulations 11/13/2009    Hep A, adult 01/03/2017, 06/30/2017    Hep B, adult 01/03/2017, 02/07/2017, 06/30/2017    INFLUENZA 11/13/2009, 08/07/2014, 09/21/2015, 09/09/2016, 10/02/2018    Pneumococcal Conjugate 13-Valent 09/21/2015    Pneumococcal Polysaccharide PPV23 12/04/2015    Tdap 08/29/2014    Tuberculin Skin Test-PPD Intradermal 06/24/2013    Zoster 09/28/2015      Medicare Screening Tests and Risk Assessments:     Booker Moritz is here for her Subsequent Wellness visit  Last Medicare Wellness visit information reviewed, patient interviewed and updates made to the record today  Health Risk Assessment:  Patient rates overall health as fair  Patient feels that their physical health rating is Slightly worse  Eyesight was rated as Same  Hearing was rated as Same  Patient feels that their emotional and mental health rating is Slightly worse   Pain experienced by patient in the last 7 days has been Some  Emotional/Mental Health:  Patient has been feeling nervous/anxious  PHQ-9 Depression Screening:    Frequency of the following problems over the past two weeks:      1  Little interest or pleasure in doing things: 0 - not at all      2  Feeling down, depressed, or hopeless: 0 - not at all  PHQ-2 Score: 0          Broken Bones/Falls: Fall Risk Assessment:    In the past year, patient has experienced: No history of falling in past year          Bladder/Bowel:  Patient has not leaked urine accidently in the last six months  Patient reports no loss of bowel control  Immunizations:  Patient has not had a flu vaccination within the last year  Patient has not received a pneumonia shot  Patient has received a shingles shot  Home Safety:  Patient has trouble with stairs inside or outside of their home  Patient currently reports that there are no safety hazards present in home, working smoke alarms, working carbon monoxide detectors  Preventative Screenings:   Breast cancer screening performed, no colon cancer screen completed,     Nutrition:  Current diet: Regular and Frequent junk food with servings of the following:    Medications:  Patient is currently taking over-the-counter supplements  Patient is able to manage medications  Lifestyle Choices:  Patient reports current tobacco use  Patient reports no alcohol use  Patient drives a vehicle  Patient wears seat belt  Activities of Daily Living:  Can get out of bed by his or her self, able to dress self, able to make own meals, able to do own shopping, able to bathe self, can do own laundry/housekeeping, can manage own money, pay bills and track expenses    Previous Hospitalizations:  No hospitalization or ED visit in past 12 months        Advanced Directives:  Patient has not decided on power of   Patient has not completed advanced directive          Preventative Screening/Counseling:      Cardiovascular: General: Risks and Benefits Discussed and Screening Current      Counseling: Healthy Diet and Healthy Weight     Due for Labs/Analytes/Optional EKG: Lipid Panel          Diabetes:      General: Risks and Benefits Discussed and Screening Current      Counseling: Healthy Weight and Healthy Diet      Due for labs: Blood Glucose          Colorectal Cancer:      General: Risks and Benefits Discussed      Due for studies: Colonoscopy - Low Risk      Comments: Had 2 colonoscopies in the past, positive FIT last year        Breast Cancer:      General: Risks and Benefits Discussed and Screening Current      Comments: F/w Gyn (Dr Samara Merlin)        Cervical Cancer:      General: Risks and Benefits Discussed and Screening Current          Osteoporosis:      General: Risks and Benefits Discussed and Screening Current          AAA:      General: Screening Not Indicated          Glaucoma:      General: Risks and Benefits Discussed and Screening Current          HIV:      General: Patient Declines          Hepatitis C:      General: Risks and Benefits Discussed and Screening Current        Advanced Directives:   Patient has no living will for healthcare, patient does not have an advanced directive  Immunizations:  Patient reviewed and up to date      Influenza: Risks & Benefits Discussed, Influenza UTD This Year and Influenza Recommended Annually      Pneumococcal: Risks & Benefits Discussed and Pneumococcal Due Today      Shingrix: Risks & Benefits Discussed and Shingrix Vaccine Needed Today      TD: Risks & Benefits Discussed and Td Vaccine UTD      TDAP: Risks & Benefits Discussed and Tdap Vaccine UTD        BMI Counseling: Body mass index is 31 68 kg/m²  Discussed the patient's BMI with her  The BMI is above average  BMI counseling and education was provided to the patient  Nutrition recommendations include reducing intake of cholesterol

## 2019-07-22 NOTE — PATIENT INSTRUCTIONS
Obesity   AMBULATORY CARE:   Obesity  is when your body mass index (BMI) is greater than 30  Your healthcare provider will use your height and weight to measure your BMI  The risks of obesity include  many health problems, such as injuries or physical disability  You may need tests to check for the following:  · Diabetes     · High blood pressure or high cholesterol     · Heart disease     · Gallbladder or liver disease     · Cancer of the colon, breast, prostate, liver, or kidney     · Sleep apnea     · Arthritis or gout  Seek care immediately if:   · You have a severe headache, confusion, or difficulty speaking  · You have weakness on one side of your body  · You have chest pain, sweating, or shortness of breath  Contact your healthcare provider if:   · You have symptoms of gallbladder or liver disease, such as pain in your upper abdomen  · You have knee or hip pain and discomfort while walking  · You have symptoms of diabetes, such as intense hunger and thirst, and frequent urination  · You have symptoms of sleep apnea, such as snoring or daytime sleepiness  · You have questions or concerns about your condition or care  Treatment for obesity  focuses on helping you lose weight to improve your health  Even a small decrease in BMI can reduce the risk for many health problems  Your healthcare provider will help you set a weight-loss goal   · Lifestyle changes  are the first step in treating obesity  These include making healthy food choices and getting regular physical activity  Your healthcare provider may suggest a weight-loss program that involves coaching, education, and therapy  · Medicine  may help you lose weight when it is used with a healthy diet and physical activity  · Surgery  can help you lose weight if you are very obese and have other health problems  There are several types of weight-loss surgery  Ask your healthcare provider for more information    Be successful losing weight:   · Set small, realistic goals  An example of a small goal is to walk for 20 minutes 5 days a week  Anther goal is to lose 5% of your body weight  · Tell friends, family members, and coworkers about your goals  and ask for their support  Ask a friend to lose weight with you, or join a weight-loss support group  · Identify foods or triggers that may cause you to overeat , and find ways to avoid them  Remove tempting high-calorie foods from your home and workplace  Place a bowl of fresh fruit on your kitchen counter  If stress causes you to eat, then find other ways to cope with stress  · Keep a diary to track what you eat and drink  Also write down how many minutes of physical activity you do each day  Weigh yourself once a week and record it in your diary  Eating changes: You will need to eat 500 to 1,000 fewer calories each day than you currently eat to lose 1 to 2 pounds a week  The following changes will help you cut calories:  · Eat smaller portions  Use small plates, no larger than 9 inches in diameter  Fill your plate half full of fruits and vegetables  Measure your food using measuring cups until you know what a serving size looks like  · Eat 3 meals and 1 or 2 snacks each day  Plan your meals in advance  Minor Zepeda and eat at home most of the time  Eat slowly  · Eat fruits and vegetables at every meal   They are low in calories and high in fiber, which makes you feel full  Do not add butter, margarine, or cream sauce to vegetables  Use herbs to season steamed vegetables  · Eat less fat and fewer fried foods  Eat more baked or grilled chicken and fish  These protein sources are lower in calories and fat than red meat  Limit fast food  Dress your salads with olive oil and vinegar instead of bottled dressing  · Limit the amount of sugar you eat  Do not drink sugary beverages  Limit alcohol  Activity changes:  Physical activity is good for your body in many ways   It helps you burn calories and build strong muscles  It decreases stress and depression, and improves your mood  It can also help you sleep better  Talk to your healthcare provider before you begin an exercise program   · Exercise for at least 30 minutes 5 days a week  Start slowly  Set aside time each day for physical activity that you enjoy and that is convenient for you  It is best to do both weight training and an activity that increases your heart rate, such as walking, bicycling, or swimming  · Find ways to be more active  Do yard work and housecleaning  Walk up the stairs instead of using elevators  Spend your leisure time going to events that require walking, such as outdoor festivals or fairs  This extra physical activity can help you lose weight and keep it off  Follow up with your healthcare provider as directed: You may need to meet with a dietitian  Write down your questions so you remember to ask them during your visits  © 2017 [employer] [employer] Information is for End User's use only and may not be sold, redistributed or otherwise used for commercial purposes  All illustrations and images included in CareNotes® are the copyrighted property of [employer]  or Hayden Villa  The above information is an  only  It is not intended as medical advice for individual conditions or treatments  Talk to your doctor, nurse or pharmacist before following any medical regimen to see if it is safe and effective for you  Urinary Incontinence   WHAT YOU NEED TO KNOW:   What is urinary incontinence? Urinary incontinence (UI) is when you lose control of your bladder  What causes UI? UI occurs because your bladder cannot store or empty urine properly  The following are the most common types of UI:  · Stress incontinence  is when you leak urine due to increased bladder pressure  This may happen when you cough, sneeze, or exercise       · Urge incontinence  is when you feel the need to urinate right away and leak urine accidentally  · Mixed incontinence  is when you have both stress and urge UI  What are the signs and symptoms of UI?   · You feel like your bladder does not empty completely when you urinate  · You urinate often and need to urinate immediately  · You leak urine when you sleep, or you wake up with the urge to urinate  · You leak urine when you cough, sneeze, exercise, or laugh  How is UI diagnosed? Your healthcare provider will ask how often you leak urine and whether you have stress or urge symptoms  Tell him which medicines you take, how often you urinate, and how much liquid you drink each day  You may need any of the following tests:  · Urine tests  may show infection or kidney function  · A pelvic exam  may be done to check for blockages  A pelvic exam will also show if your bladder, uterus, or other organs have moved out of place  · An x-ray, ultrasound, or CT  may show problems with parts of your urinary system  You may be given contrast liquid to help your organs show up better in the pictures  Tell the healthcare provider if you have ever had an allergic reaction to contrast liquid  Do not enter the MRI room with anything metal  Metal can cause serious injury  Tell the healthcare provider if you have any metal in or on your body  · A bladder scan  will show how much urine is left in your bladder after you urinate  You will be asked to urinate and then healthcare providers will use a small ultrasound machine to check the urine left in your bladder  · Cystometry  is used to check the function of your urinary system  Your healthcare provider checks the pressure in your bladder while filling it with fluid  Your bladder pressure may also be tested when your bladder is full and while you urinate  How is UI treated? · Medicines  can help strengthen your bladder control      · Electrical stimulation  is used to send a small amount of electrical energy to your pelvic floor muscles  This helps control your bladder function  Electrodes may be placed outside your body or in your rectum  For women, the electrodes may be placed in the vagina  · A bulking agent  may be injected into the wall of your urethra to make it thicker  This helps keep your urethra closed and decreases urine leakage  · Devices  such as a clamp, pessary, or tampon may help stop urine leaks  Ask your healthcare provider for more information about these and other devices  · Surgery  may be needed if other treatments do not work  Several types of surgery can help improve your bladder control  Ask your healthcare provider for more information about the surgery you may need  How can I manage my symptoms? · Do pelvic muscle exercises often  Your pelvic muscles help you stop urinating  Squeeze these muscles tight for 5 seconds, then relax for 5 seconds  Gradually work up to squeezing for 10 seconds  Do 3 sets of 15 repetitions a day, or as directed  This will help strengthen your pelvic muscles and improve bladder control  · A catheter  may be used to help empty your bladder  A catheter is a tiny, plastic tube that is put into your bladder to drain your urine  Your healthcare provider may tell you to use a catheter to prevent your bladder from getting too full and leaking urine  · Keep a UI record  Write down how often you leak urine and how much you leak  Make a note of what you were doing when you leaked urine  · Train your bladder  Go to the bathroom at set times, such as every 2 hours, even if you do not feel the urge to go  You can also try to hold your urine when you feel the urge to go  For example, hold your urine for 5 minutes when you feel the urge to go  As that becomes easier, hold your urine for 10 minutes  · Drink liquids as directed  Ask your healthcare provider how much liquid to drink each day and which liquids are best for you   You may need to limit the amount of liquid you drink to help control your urine leakage  Limit or do not have drinks that contain caffeine or alcohol  Do not drink any liquid right before you go to bed  · Prevent constipation  Eat a variety of high-fiber foods  Good examples are high-fiber cereals, beans, vegetables, and whole-grain breads  Prune juice may help make your bowel movement softer  Walking is the best way to trigger your intestines to have a bowel movement  · Exercise regularly and maintain a healthy weight  Ask your healthcare provider how much you should weigh and about the best exercise plan for you  Weight loss and exercise will decrease pressure on your bladder and help you control your leakage  Ask him to help you create a weight loss plan if you are overweight  When should I seek immediate care? · You have severe pain  · You are confused or cannot think clearly  When should I contact my healthcare provider? · You have a fever  · You see blood in your urine  · You have pain when you urinate  · You have new or worse pain, even after treatment  · Your mouth feels dry or you have vision changes  · Your urine is cloudy or smells bad  · You have questions or concerns about your condition or care  CARE AGREEMENT:   You have the right to help plan your care  Learn about your health condition and how it may be treated  Discuss treatment options with your caregivers to decide what care you want to receive  You always have the right to refuse treatment  The above information is an  only  It is not intended as medical advice for individual conditions or treatments  Talk to your doctor, nurse or pharmacist before following any medical regimen to see if it is safe and effective for you  © 2017 [employer] Information is for End User's use only and may not be sold, redistributed or otherwise used for commercial purposes   All illustrations and images included in CareNotes® are the copyrighted property of [employer] M [employer]  or Hayden Villa  Cigarette Smoking and Your Health   AMBULATORY CARE:   Risks to your health if you smoke:  Nicotine and other chemicals found in tobacco damage every cell in your body  Even if you are a light smoker, you have an increased risk for cancer, heart disease, and lung disease  If you are pregnant or have diabetes, smoking increases your risk for complications  Benefits to your health if you stop smoking:   · You decrease respiratory symptoms such as coughing, wheezing, and shortness of breath  · You reduce your risk for cancers of the lung, mouth, throat, kidney, bladder, pancreas, stomach, and cervix  If you already have cancer, you increase the benefits of chemotherapy  You also reduce your risk for cancer returning or a second cancer from developing  · You reduce your risk for heart disease, blood clots, heart attack, and stroke  · You reduce your risk for lung infections, and diseases such as pneumonia, asthma, chronic bronchitis, and emphysema  · Your circulation improves  More oxygen can be delivered to your body  If you have diabetes, you lower your risk for complications, such as kidney, artery, and eye diseases  You also lower your risk for nerve damage  Nerve damage can lead to amputations, poor vision, and blindness  · You improve your body's ability to heal and to fight infections  Benefits to the health of others if you stop smoking:  Tobacco is harmful to nonsmokers who breathe in your secondhand smoke  The following are ways the health of others around you may improve when you stop smoking:  · You lower the risks for lung cancer and heart disease in nonsmoking adults  · If you are pregnant, you lower the risk for miscarriage, early delivery, low birth weight, and stillbirth  You also lower your baby's risk for SIDS, obesity, developmental delay, and neurobehavioral problems, such as ADHD  · If you have children, you lower their risk for ear infections, colds, pneumonia, bronchitis, and asthma  For more information and support to stop smoking:   · Smokefree  gov  Phone: 6- 830 - 044-4005  Web Address: www smokefree  gov  Follow up with your healthcare provider as directed:  Write down your questions so you remember to ask them during your visits  © 2017 [address] [employer] Information is for End User's use only and may not be sold, redistributed or otherwise used for commercial purposes  All illustrations and images included in CareNotes® are the copyrighted property of A [employer] M [employer]  or Hayden Villa  The above information is an  only  It is not intended as medical advice for individual conditions or treatments  Talk to your doctor, nurse or pharmacist before following any medical regimen to see if it is safe and effective for you  Fall Prevention   AMBULATORY CARE:   Fall prevention  includes ways to make your home and other areas safer  It also includes ways you can move more carefully to prevent a fall  Health conditions that cause changes in your blood pressure, vision, or muscle strength and coordination may increase your risk for falls  Medicines may also increase your risk for falls if they make you dizzy, weak, or sleepy  Call 911 or have someone else call if:   · You have fallen and are unconscious  · You have fallen and cannot move part of your body  Contact your healthcare provider if:   · You have fallen and have pain or a headache  · You have questions or concerns about your condition or care  Fall prevention tips:   · Stand or sit up slowly  This may help you keep your balance and prevent falls  · Use assistive devices as directed  Your healthcare provider may suggest that you use a cane or walker to help you keep your balance  You may need to have grab bars put in your bathroom near the toilet or in the shower      · Wear shoes that fit well and have soles that   Wear shoes both inside and outside  Use slippers with good   Do not wear shoes with high heels  · Wear a personal alarm  This is a device that allows you to call 911 if you fall and need help  Ask your healthcare provider for more information  · Stay active  Exercise can help strengthen your muscles and improve your balance  Your healthcare provider may recommend water aerobics or walking  He or she may also recommend physical therapy to improve your coordination  Never start an exercise program without talking to your healthcare provider first      · Manage your medical conditions  Keep all appointments with your healthcare providers  Visit your eye doctor as directed  Home safety tips:   · Add items to prevent falls in the bathroom  Put nonslip strips on your bath or shower floor to prevent you from slipping  Use a bath mat if you do not have carpet in the bathroom  This will prevent you from falling when you step out of the bath or shower  Use a shower seat so you do not need to stand while you shower  Sit on the toilet or a chair in your bathroom to dry yourself and put on clothing  This will prevent you from losing your balance from drying or dressing yourself while you are standing  · Keep paths clear  Remove books, shoes, and other objects from walkways and stairs  Place cords for telephones and lamps out of the way so that you do not need to walk over them  Tape them down if you cannot move them  Remove small rugs  If you cannot remove a rug, secure it with double-sided tape  This will prevent you from tripping  · Install bright lights in your home  Use night lights to help light paths to the bathroom or kitchen  Always turn on the light before you start walking  · Keep items you use often on shelves within reach  Do not use a step stool to help you reach an item  · Paint or place reflective tape on the edges of your stairs    This will help you see the stairs better  Follow up with your healthcare provider as directed:  Write down your questions so you remember to ask them during your visits  © 2017 [address] [employer] Information is for End User's use only and may not be sold, redistributed or otherwise used for commercial purposes  All illustrations and images included in CareNotes® are the copyrighted property of [employer]  or a[employer]den Villa  The above information is an  only  It is not intended as medical advice for individual conditions or treatments  Talk to your doctor, nurse or pharmacist before following any medical regimen to see if it is safe and effective for you  Advance Directives   WHAT YOU NEED TO KNOW:   What are advance directives? Advance directives are legal documents that state your wishes and plans for medical care  These plans are made ahead of time in case you lose your ability to make decisions for yourself  Advance directives can apply to any medical decision, such as the treatments you want, and if you want to donate organs  What are the types of advance directives? There are many types of advance directives, and each state has rules about how to use them  You may choose a combination of any of the following:  · Living will: This is a written record of the treatment you want  You can also choose which treatments you do not want, which to limit, and which to stop at a certain time  This includes surgery, medicine, IV fluid, and tube feedings  · Durable power of  for healthcare [facility]): This is a written record that states who you want to make healthcare choices for you when you are unable to make them for yourself  This person, called a proxy, is usually a family member or a friend  You may choose more than 1 proxy  · Do not resuscitate (DNR) order:  A DNR order is used in case your heart stops beating or you stop breathing   It is a request not to have certain forms of treatment, such as CPR  A DNR order may be included in other types of advance directives  · Medical directive: This covers the care that you want if you are in a coma, near death, or unable to make decisions for yourself  You can list the treatments you want for each condition  Treatment may include pain medicine, surgery, blood transfusions, dialysis, IV or tube feedings, and a ventilator (breathing machine)  · Values history: This document has questions about your views, beliefs, and how you feel and think about life  This information can help others choose the care that you would choose  Why are advance directives important? An advance directive helps you control your care  Although spoken wishes may be used, it is better to have your wishes written down  Spoken wishes can be misunderstood, or not followed  Treatments may be given even if you do not want them  An advance directive may make it easier for your family to make difficult choices about your care  How do I decide what to put in my advance directives? · Make informed decisions:  Make sure you fully understand treatments or care you may receive  Think about the benefits and problems your decisions could cause for you or your family  Talk to healthcare providers if you have concerns or questions before you write down your wishes  You may also want to talk with your [employer] or , or a   Check your state laws to make sure that what you put in your advance directive is legal      · Sign all forms:  Sign and date your advance directive when you have finished  You may also need 2 witnesses to sign the forms  Witnesses cannot be your doctor or his staff, your spouse, heirs or beneficiaries, people you owe money to, or your chosen proxy  Talk to your family, proxy, and healthcare providers about your advance directive  Give each person a copy, and keep one for yourself in a place you can get to easily   Do not keep it hidden or locked away  · Review and revise your plans: You can revise your advance directive at any time, as long as you are able to make decisions  Review your plan every year, and when there are changes in your life, or your health  When you make changes, let your family, proxy, and healthcare providers know  Give each a new copy  Where can I find more information? · [employer]  [address] , [place]  Phone: 3- 186 - [phone]  Phone: 8- [phone] - [phone]  Web Address: http://www  aafp org  · [address] [facility])  [address] [address] [facility], [address] [facility] , [address] [place] [address]  Phone: 3- [phone] - [phone]  Phone: [phone]  Web Address: Bentley morales  CARE AGREEMENT:   You have the right to help plan your care  To help with this plan, you must learn about your health condition and treatment options  You must also learn about advance directives and how they are used  Work with your healthcare providers to decide what care will be used to treat you  You always have the right to refuse treatment  The above information is an  only  It is not intended as medical advice for individual conditions or treatments  Talk to your doctor, nurse or pharmacist before following any medical regimen to see if it is safe and effective for you  © 2017 [address] [employer] Information is for End User's use only and may not be sold, redistributed or otherwise used for commercial purposes  All illustrations and images included in CareNotes® are the copyrighted property of [employer] M [employer]  or Hayden Villa

## 2019-07-23 ENCOUNTER — APPOINTMENT (OUTPATIENT)
Dept: LAB | Facility: MEDICAL CENTER | Age: 64
End: 2019-07-23
Payer: MEDICARE

## 2019-07-23 DIAGNOSIS — E06.3 CHRONIC AUTOIMMUNE THYROIDITIS: ICD-10-CM

## 2019-07-23 DIAGNOSIS — E61.1 IRON DEFICIENCY: ICD-10-CM

## 2019-07-23 DIAGNOSIS — Z13.220 SCREENING FOR HYPERLIPIDEMIA: ICD-10-CM

## 2019-07-23 DIAGNOSIS — R53.83 OTHER FATIGUE: ICD-10-CM

## 2019-07-23 LAB
ALBUMIN SERPL BCP-MCNC: 3.8 G/DL (ref 3.5–5)
ALP SERPL-CCNC: 80 U/L (ref 46–116)
ALT SERPL W P-5'-P-CCNC: 23 U/L (ref 12–78)
ANION GAP SERPL CALCULATED.3IONS-SCNC: 3 MMOL/L (ref 4–13)
AST SERPL W P-5'-P-CCNC: 15 U/L (ref 5–45)
BASOPHILS # BLD AUTO: 0.09 THOUSANDS/ΜL (ref 0–0.1)
BASOPHILS NFR BLD AUTO: 1 % (ref 0–1)
BILIRUB SERPL-MCNC: 0.58 MG/DL (ref 0.2–1)
BUN SERPL-MCNC: 13 MG/DL (ref 5–25)
CALCIUM SERPL-MCNC: 9.6 MG/DL (ref 8.3–10.1)
CHLORIDE SERPL-SCNC: 106 MMOL/L (ref 100–108)
CHOLEST SERPL-MCNC: 173 MG/DL (ref 50–200)
CO2 SERPL-SCNC: 27 MMOL/L (ref 21–32)
CREAT SERPL-MCNC: 0.68 MG/DL (ref 0.6–1.3)
EOSINOPHIL # BLD AUTO: 0.4 THOUSAND/ΜL (ref 0–0.61)
EOSINOPHIL NFR BLD AUTO: 5 % (ref 0–6)
ERYTHROCYTE [DISTWIDTH] IN BLOOD BY AUTOMATED COUNT: 14.9 % (ref 11.6–15.1)
FERRITIN SERPL-MCNC: 116 NG/ML (ref 8–388)
GFR SERPL CREATININE-BSD FRML MDRD: 91 ML/MIN/1.73SQ M
GLUCOSE P FAST SERPL-MCNC: 94 MG/DL (ref 65–99)
HCT VFR BLD AUTO: 43.8 % (ref 34.8–46.1)
HDLC SERPL-MCNC: 68 MG/DL (ref 40–60)
HGB BLD-MCNC: 14 G/DL (ref 11.5–15.4)
IMM GRANULOCYTES # BLD AUTO: 0.02 THOUSAND/UL (ref 0–0.2)
IMM GRANULOCYTES NFR BLD AUTO: 0 % (ref 0–2)
IRON SATN MFR SERPL: 33 %
IRON SERPL-MCNC: 101 UG/DL (ref 50–170)
LDLC SERPL CALC-MCNC: 89 MG/DL (ref 0–100)
LYMPHOCYTES # BLD AUTO: 4.23 THOUSANDS/ΜL (ref 0.6–4.47)
LYMPHOCYTES NFR BLD AUTO: 51 % (ref 14–44)
MCH RBC QN AUTO: 27.3 PG (ref 26.8–34.3)
MCHC RBC AUTO-ENTMCNC: 32 G/DL (ref 31.4–37.4)
MCV RBC AUTO: 86 FL (ref 82–98)
MONOCYTES # BLD AUTO: 0.67 THOUSAND/ΜL (ref 0.17–1.22)
MONOCYTES NFR BLD AUTO: 8 % (ref 4–12)
NEUTROPHILS # BLD AUTO: 2.97 THOUSANDS/ΜL (ref 1.85–7.62)
NEUTS SEG NFR BLD AUTO: 35 % (ref 43–75)
NRBC BLD AUTO-RTO: 0 /100 WBCS
PLATELET # BLD AUTO: 239 THOUSANDS/UL (ref 149–390)
PMV BLD AUTO: 10.9 FL (ref 8.9–12.7)
POTASSIUM SERPL-SCNC: 4.3 MMOL/L (ref 3.5–5.3)
PROT SERPL-MCNC: 7.6 G/DL (ref 6.4–8.2)
RBC # BLD AUTO: 5.12 MILLION/UL (ref 3.81–5.12)
SODIUM SERPL-SCNC: 136 MMOL/L (ref 136–145)
TIBC SERPL-MCNC: 304 UG/DL (ref 250–450)
TRIGL SERPL-MCNC: 82 MG/DL
TSH SERPL DL<=0.05 MIU/L-ACNC: 0.89 UIU/ML (ref 0.36–3.74)
WBC # BLD AUTO: 8.38 THOUSAND/UL (ref 4.31–10.16)

## 2019-07-23 PROCEDURE — 36415 COLL VENOUS BLD VENIPUNCTURE: CPT | Performed by: FAMILY MEDICINE

## 2019-07-23 PROCEDURE — 83540 ASSAY OF IRON: CPT

## 2019-07-23 PROCEDURE — 85025 COMPLETE CBC W/AUTO DIFF WBC: CPT | Performed by: FAMILY MEDICINE

## 2019-07-23 PROCEDURE — 80061 LIPID PANEL: CPT

## 2019-07-23 PROCEDURE — 82728 ASSAY OF FERRITIN: CPT

## 2019-07-23 PROCEDURE — 84443 ASSAY THYROID STIM HORMONE: CPT

## 2019-07-23 PROCEDURE — 83550 IRON BINDING TEST: CPT

## 2019-07-23 PROCEDURE — 80053 COMPREHEN METABOLIC PANEL: CPT | Performed by: FAMILY MEDICINE

## 2019-08-02 ENCOUNTER — TELEPHONE (OUTPATIENT)
Dept: ENDOCRINOLOGY | Facility: CLINIC | Age: 64
End: 2019-08-02

## 2019-08-02 NOTE — TELEPHONE ENCOUNTER
----- Message from Reji Titus PA-C sent at 8/2/2019 10:39 AM EDT -----  Thyroid/Iron labs normal   Hasn't seen Dr Luke Wilhelm since 10/2018-- needs to establish care with another physician or PA/NP in our office  Or may follow up with PCP for continued care

## 2019-10-16 ENCOUNTER — TELEPHONE (OUTPATIENT)
Dept: GASTROENTEROLOGY | Facility: CLINIC | Age: 64
End: 2019-10-16

## 2019-10-16 NOTE — TELEPHONE ENCOUNTER
Called and lvm asking if pt has ever had a colonoscopy  If so we ask that she bring those records or call us back so we can have them faxed over to our office

## 2019-10-21 ENCOUNTER — CONSULT (OUTPATIENT)
Dept: GASTROENTEROLOGY | Facility: AMBULARY SURGERY CENTER | Age: 64
End: 2019-10-21
Payer: MEDICARE

## 2019-10-21 VITALS
TEMPERATURE: 97.6 F | DIASTOLIC BLOOD PRESSURE: 84 MMHG | RESPIRATION RATE: 18 BRPM | HEART RATE: 86 BPM | WEIGHT: 199 LBS | SYSTOLIC BLOOD PRESSURE: 132 MMHG | HEIGHT: 65 IN | BODY MASS INDEX: 33.15 KG/M2

## 2019-10-21 DIAGNOSIS — K63.5 POLYP OF COLON, UNSPECIFIED PART OF COLON, UNSPECIFIED TYPE: ICD-10-CM

## 2019-10-21 DIAGNOSIS — Z12.11 SCREEN FOR COLON CANCER: Primary | ICD-10-CM

## 2019-10-21 PROCEDURE — 99204 OFFICE O/P NEW MOD 45 MIN: CPT | Performed by: INTERNAL MEDICINE

## 2019-10-21 NOTE — PROGRESS NOTES
Consultation - [facility]  Gian Womack 75 y o  female MRN: [id number]  Unit/Bed#:  Encounter: [id number]        Consults    ASSESSMENT/PLAN:     1  Colon cancer screening-increased risk, has history of colon polyps  -will schedule increased risk screening colonoscopy at this time  - Patient was explained about  the risks and benefits of the procedure  Risks including but not limited to bleeding, infection, perforation were explained in detail  Also explained about less than 100% sensitivity with the exam and other alternatives   -labs were reviewed, kidney function is normal, liver function is normal, hemoglobin 14  Platelets are normal               ______________________________________________________________________    Reason for Consult / Principal Problem: [unfilled]    HPI: Gian Womack is a 75y o  year old female with history of autoimmune thyroiditis, colon polyps, presents for colon cancer screening evaluation  Patient states that she underwent colonoscopy in 2007 in [place] and was noted to have 15 mm polyp along with internal hemorrhoids  Patient states that she had a repeat colonoscopy in 2012 which was unremarkable  She was recommended a repeat at 5 year interval   She denies any change in bowel habits, hematochezia, abdominal pain or unintentional weight loss  No upper GI symptoms including dysphagia, heartburn, epigastric pain, loss of appetite or nausea and vomiting  Patient recently quit smoking  Review of Systems: The remainder of the review of systems was negative except for the pertinent positives noted in HPI       Historical Information   Past Medical History:   Diagnosis Date    Basal cell carcinoma 2016    Colon polyp     Fatty liver     Hypothyroidism     Lichen sclerosus     Miscarriage     Obesity     Sebaceous cyst 2012    of Scalp     Stage 2 chronic kidney disease     Vitamin D deficiency      Past Surgical History: Procedure Laterality Date    CHOLECYSTECTOMY      2014    EYE SURGERY      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    HEEL SPUR SURGERY      2012    MALIGNANT SKIN LESION EXCISION  2016    POLYPECTOMY      2012    PROSTHODONTIC PROCEDURE      TOOTH EXTRACTION       Social History   Social History     Substance and Sexual Activity   Alcohol Use No     Social History     Substance and Sexual Activity   Drug Use No     Social History     Tobacco Use   Smoking Status Light Tobacco Smoker    Packs/day: 0 25    Types: Cigarettes   Smokeless Tobacco Never Used     Family History   Problem Relation Age of Onset    No Known Problems Mother     No Known Problems Father     No Known Problems Brother     No Known Problems Daughter     No Known Problems Son     No Known Problems Maternal Grandmother     No Known Problems Maternal Grandfather     No Known Problems Paternal Grandmother     No Known Problems Paternal Grandfather     No Known Problems Daughter     No Known Problems Daughter     Congenital heart disease Daughter     SIDS Daughter        Meds/Allergies       (Not in a hospital admission)  No current facility-administered medications for this visit  No Known Allergies    Objective     Blood pressure 132/84, pulse 86, temperature 97 6 °F (36 4 °C), temperature source Tympanic, resp  rate 18, height 5' 5" (1 651 m), weight 90 3 kg (199 lb), not currently breastfeeding  [unfilled]    PHYSICAL EXAM     GEN: well nourished, well developed, no acute distress  HEENT: anicteric, MMM, no cervical or supraclavicular lymphadenopathy  CV: RRR, no m/r/g  CHEST: CTA b/l, no WRR  ABD: +BS, soft, NT/ND, no hepatosplenomegaly  EXT: no c/c/e  SKIN: no rashes,  NEURO: aaox3    Lab Results:   No visits with results within 1 Day(s) from this visit     Latest known visit with results is:   Appointment on 07/23/2019   Component Date Value    [facility]     Cholesterol 07/23/2019 173     Triglycerides 07/23/2019 82     HDL, Direct 07/23/2019 68*    LDL Calculated 07/23/2019 89     Iron Saturation 07/23/2019 33     TIBC 07/23/2019 304     Iron 07/23/2019 101     Ferritin 07/23/2019 116      Imaging Studies: I have personally reviewed pertinent films in PACS                Answers for HPI/ROS submitted by the patient on 10/15/2019   Abdominal pain  Progression since onset: resolved  anorexia: No  arthralgias: No  belching: No  constipation: No  diarrhea: No  dysuria: No  fever: No  flatus: No  frequency: No  headaches: No  hematochezia: No  hematuria: No  melena: No  myalgias: No  nausea:  No  weight loss: No  vomiting: No  Aggravated by: nothing

## 2019-10-21 NOTE — LETTER
October 22, 2019     Meryl Chen MD  [address] [place] [address]    Patient: Mally Carney   YOB: 1955   Date of Visit: 10/21/2019       Dear Dr Maddie Chen: Thank you for referring Mally Carney to me for evaluation  Below are my notes for this consultation  If you have questions, please do not hesitate to call me  I look forward to following your patient along with you  Sincerely,        Kellen Cline MD        CC: No Recipients  Kellen Cline MD  10/21/2019  9:48 AM  Sign at close encounter  Consultation - [facility]  Mally Carney 75 y o  female MRN: [id number]  Unit/Bed#:  Encounter: [id number]        Consults    ASSESSMENT/PLAN:     1  Colon cancer screening-increased risk, has history of colon polyps  -will schedule increased risk screening colonoscopy at this time  - Patient was explained about  the risks and benefits of the procedure  Risks including but not limited to bleeding, infection, perforation were explained in detail  Also explained about less than 100% sensitivity with the exam and other alternatives   -labs were reviewed, kidney function is normal, liver function is normal, hemoglobin 14  Platelets are normal               ______________________________________________________________________    Reason for Consult / Principal Problem: [unfilled]    HPI: Mally Carney is a 75y o  year old female with history of autoimmune thyroiditis, colon polyps, presents for colon cancer screening evaluation  Patient states that she underwent colonoscopy in 2007 in [place] and was noted to have 15 mm polyp along with internal hemorrhoids  Patient states that she had a repeat colonoscopy in 2012 which was unremarkable  She was recommended a repeat at 5 year interval   She denies any change in bowel habits, hematochezia, abdominal pain or unintentional weight loss    No upper GI symptoms including dysphagia, heartburn, epigastric pain, loss of appetite or nausea and vomiting  Patient recently quit smoking  Review of Systems: The remainder of the review of systems was negative except for the pertinent positives noted in HPI  Historical Information   Past Medical History:   Diagnosis Date    Basal cell carcinoma 2016    Colon polyp     Fatty liver     Hypothyroidism     Lichen sclerosus     Miscarriage     Obesity     Sebaceous cyst 2012    of Scalp     Stage 2 chronic kidney disease     Vitamin D deficiency      Past Surgical History:   Procedure Laterality Date    CHOLECYSTECTOMY      2014    EYE SURGERY      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    HEEL SPUR SURGERY      2012    MALIGNANT SKIN LESION EXCISION  2016    POLYPECTOMY      2012    PROSTHODONTIC PROCEDURE      TOOTH EXTRACTION       Social History   Social History     Substance and Sexual Activity   Alcohol Use No     Social History     Substance and Sexual Activity   Drug Use No     Social History     Tobacco Use   Smoking Status Light Tobacco Smoker    Packs/day: 0 25    Types: Cigarettes   Smokeless Tobacco Never Used     Family History   Problem Relation Age of Onset    No Known Problems Mother     No Known Problems Father     No Known Problems Brother     No Known Problems Daughter     No Known Problems Son     No Known Problems Maternal Grandmother     No Known Problems Maternal Grandfather     No Known Problems Paternal Grandmother     No Known Problems Paternal Grandfather     No Known Problems Daughter     No Known Problems Daughter     Congenital heart disease Daughter     SIDS Daughter        Meds/Allergies       (Not in a hospital admission)  No current facility-administered medications for this visit  No Known Allergies    Objective     Blood pressure 132/84, pulse 86, temperature 97 6 °F (36 4 °C), temperature source Tympanic, resp   rate 18, height 5' 5" (1 651 m), weight 90 3 kg (199 lb), not currently breastfeeding  [unfilled]    PHYSICAL EXAM     GEN: well nourished, well developed, no acute distress  HEENT: anicteric, MMM, no cervical or supraclavicular lymphadenopathy  CV: RRR, no m/r/g  CHEST: CTA b/l, no WRR  ABD: +BS, soft, NT/ND, no hepatosplenomegaly  EXT: no c/c/e  SKIN: no rashes,  NEURO: aaox3    Lab Results:   No visits with results within 1 Day(s) from this visit  Latest known visit with results is:   Appointment on 07/23/2019   Component Date Value    TSH 3RD GENERATON 07/23/2019 0 888     Cholesterol 07/23/2019 173     Triglycerides 07/23/2019 82     HDL, Direct 07/23/2019 68*    LDL Calculated 07/23/2019 89     Iron Saturation 07/23/2019 33     TIBC 07/23/2019 304     Iron 07/23/2019 101     Ferritin 07/23/2019 116      Imaging Studies: I have personally reviewed pertinent films in PACS                Answers for HPI/ROS submitted by the patient on 10/15/2019   Abdominal pain  Progression since onset: resolved  anorexia: No  arthralgias: No  belching: No  constipation: No  diarrhea: No  dysuria: No  fever: No  flatus: No  frequency: No  headaches: No  hematochezia: No  hematuria: No  melena: No  myalgias: No  nausea:  No  weight loss: No  vomiting: No  Aggravated by: nothing

## 2019-11-08 ENCOUNTER — OFFICE VISIT (OUTPATIENT)
Dept: FAMILY MEDICINE CLINIC | Facility: CLINIC | Age: 64
End: 2019-11-08
Payer: MEDICARE

## 2019-11-08 VITALS
BODY MASS INDEX: 33.2 KG/M2 | WEIGHT: 199.5 LBS | RESPIRATION RATE: 16 BRPM | SYSTOLIC BLOOD PRESSURE: 122 MMHG | HEART RATE: 72 BPM | DIASTOLIC BLOOD PRESSURE: 90 MMHG | OXYGEN SATURATION: 96 % | TEMPERATURE: 96.6 F

## 2019-11-08 DIAGNOSIS — M25.562 ACUTE PAIN OF BOTH KNEES: ICD-10-CM

## 2019-11-08 DIAGNOSIS — E06.3 CHRONIC AUTOIMMUNE THYROIDITIS: ICD-10-CM

## 2019-11-08 DIAGNOSIS — E66.09 CLASS 1 OBESITY DUE TO EXCESS CALORIES WITHOUT SERIOUS COMORBIDITY WITH BODY MASS INDEX (BMI) OF 33.0 TO 33.9 IN ADULT: ICD-10-CM

## 2019-11-08 DIAGNOSIS — Z87.891 FORMER SMOKER: ICD-10-CM

## 2019-11-08 DIAGNOSIS — M25.561 ACUTE PAIN OF BOTH KNEES: ICD-10-CM

## 2019-11-08 DIAGNOSIS — E55.9 VITAMIN D DEFICIENCY: ICD-10-CM

## 2019-11-08 DIAGNOSIS — M79.10 MYALGIA: Primary | ICD-10-CM

## 2019-11-08 PROCEDURE — 99214 OFFICE O/P EST MOD 30 MIN: CPT | Performed by: FAMILY MEDICINE

## 2019-11-08 NOTE — PROGRESS NOTES
FAMILY MEDICINE PROGRESS NOTE  Molly Londono 75 y o  female   DATE: November 8, 2019     ASSESSMENT and PLAN:  Molly Londono is a 75 y o  female with:     1  Myalgia  Likely due to dehydration versus electrolyte abnormalities  No s/sx of PAD, but given symptoms and former smoking history, if persists, check ABIs  - Comprehensive metabolic panel  - Magnesium; Future  - Vitamin D 25 hydroxy; Future    2  Vitamin D deficiency  - Vitamin D 25 hydroxy; Future    3  Former smoker  Quit in August 2019! Congratulated her on this achievement! 4  Chronic autoimmune thyroiditis  Recheck TSH  - TSH, 3rd generation; Future    5  Class 1 obesity due to excess calories without serious comorbidity with body mass index (BMI) of 33 0 to 33 9 in adult  BMI Counseling: Body mass index is 33 2 kg/m²  The BMI is above normal  Patient referred to bariatric surgery due to patient being obese  - Ambulatory referral to Weight Management; Future    6  Acute pain of both knees  Check Xrays, if OA present, consider CS injections and PT   - XR knee 3 vw right non injury; Future  - XR knee 3 vw left non injury; Future    Patient agreeable with the plan and expressed understanding  I discucssed signs and symptoms for which to RTC, go to ER or seek urgent medical care  Patient is interested in in getting the high-dose flu shot, but unfortunately it is not available today, will have patient return once we have the shots  SUBJECTIVE:  Molly Londono is a 75 y o  female who presents today with a chief complaint of Leg Pain (bilateral)  Patient is here with 2 weeks worth of bilateral leg aching, she thinks it is worse at night time, occasionally she has cramps, denies severe cramps  She states 1 time this past week she had to stop shopping because the pain got so bad  Otherwise there are times that she can go walk around and there is no pain at all    Also she has been having bilateral knee pain, this is more, states that she was told before that she had arthritis in her knees, but it has been getting worse  States this is largely unrelated though  Patient is concerned that she may have some underlying issue  Patient states that she does not drink much water at all throughout the day, has been in active recently  She has been stressed more recently because she now has custody of her granddaughter  Quit smoking in August 2019! Leg Pain    The incident occurred more than 1 week ago (2 weeks ago)  There was no injury mechanism  The pain is present in the left leg, left thigh, right thigh and right leg  The quality of the pain is described as aching and cramping  The pain is mild  The pain has been fluctuating since onset  Pertinent negatives include no inability to bear weight, loss of motion, loss of sensation, muscle weakness, numbness or tingling  She reports no foreign bodies present  The symptoms are aggravated by weight bearing  She has tried NSAIDs, ice, heat, non-weight bearing and rest for the symptoms  The treatment provided mild relief  Review of Systems   Constitutional: Negative for fatigue  Respiratory: Negative for shortness of breath  Cardiovascular: Negative for chest pain  Musculoskeletal: Positive for arthralgias and myalgias  Neurological: Negative for tingling and numbness  I have reviewed the patient's Past Medical History  OBJECTIVE:  /90   Pulse 72   Temp (!) 96 6 °F (35 9 °C)   Resp 16   Wt 90 5 kg (199 lb 8 oz)   SpO2 96%   BMI 33 20 kg/m²    Physical Exam   Constitutional: She is oriented to person, place, and time  She appears well-developed and well-nourished  No distress  Cardiovascular: Normal rate, regular rhythm and normal heart sounds  No murmur heard  Pulmonary/Chest: Effort normal and breath sounds normal  No respiratory distress  She has no wheezes  She has no rales  Musculoskeletal: Normal range of motion  She exhibits no edema, tenderness or deformity  Lumbar back: Normal  She exhibits normal range of motion, no tenderness, no bony tenderness, no swelling, no edema and no spasm  No calf tenderness, negative Homans sign bilaterally   Neurological: She is alert and oriented to person, place, and time  She exhibits normal muscle tone  Coordination normal    Skin: She is not diaphoretic  Psychiatric: She has a normal mood and affect  Vitals reviewed  Luis Angel Arana MD    Note: Portions of the record have been created with voice recognition software  Occasional wrong word or "sound a like" substitutions may have occurred due to the inherent limitations of voice recognition software  Read the chart carefully and recognize, using context, where substitutions have occurred

## 2019-11-11 ENCOUNTER — APPOINTMENT (OUTPATIENT)
Dept: LAB | Facility: MEDICAL CENTER | Age: 64
End: 2019-11-11
Payer: MEDICARE

## 2019-11-11 ENCOUNTER — TELEPHONE (OUTPATIENT)
Dept: FAMILY MEDICINE CLINIC | Facility: CLINIC | Age: 64
End: 2019-11-11

## 2019-11-11 ENCOUNTER — APPOINTMENT (OUTPATIENT)
Dept: RADIOLOGY | Facility: MEDICAL CENTER | Age: 64
End: 2019-11-11
Payer: MEDICARE

## 2019-11-11 DIAGNOSIS — E55.9 VITAMIN D DEFICIENCY: ICD-10-CM

## 2019-11-11 DIAGNOSIS — E06.3 CHRONIC AUTOIMMUNE THYROIDITIS: ICD-10-CM

## 2019-11-11 DIAGNOSIS — M25.561 ACUTE PAIN OF BOTH KNEES: ICD-10-CM

## 2019-11-11 DIAGNOSIS — M25.562 ACUTE PAIN OF BOTH KNEES: ICD-10-CM

## 2019-11-11 DIAGNOSIS — M79.10 MYALGIA: ICD-10-CM

## 2019-11-11 LAB
25(OH)D3 SERPL-MCNC: 30.1 NG/ML (ref 30–100)
ALBUMIN SERPL BCP-MCNC: 4 G/DL (ref 3.5–5)
ALP SERPL-CCNC: 83 U/L (ref 46–116)
ALT SERPL W P-5'-P-CCNC: 28 U/L (ref 12–78)
ANION GAP SERPL CALCULATED.3IONS-SCNC: 6 MMOL/L (ref 4–13)
AST SERPL W P-5'-P-CCNC: 22 U/L (ref 5–45)
BILIRUB SERPL-MCNC: 0.48 MG/DL (ref 0.2–1)
BUN SERPL-MCNC: 14 MG/DL (ref 5–25)
CALCIUM SERPL-MCNC: 9.9 MG/DL (ref 8.3–10.1)
CHLORIDE SERPL-SCNC: 110 MMOL/L (ref 100–108)
CO2 SERPL-SCNC: 26 MMOL/L (ref 21–32)
CREAT SERPL-MCNC: 0.72 MG/DL (ref 0.6–1.3)
GFR SERPL CREATININE-BSD FRML MDRD: 88 ML/MIN/1.73SQ M
GLUCOSE P FAST SERPL-MCNC: 81 MG/DL (ref 65–99)
MAGNESIUM SERPL-MCNC: 1.7 MG/DL (ref 1.6–2.6)
POTASSIUM SERPL-SCNC: 4.1 MMOL/L (ref 3.5–5.3)
PROT SERPL-MCNC: 7.5 G/DL (ref 6.4–8.2)
SODIUM SERPL-SCNC: 142 MMOL/L (ref 136–145)
TSH SERPL DL<=0.05 MIU/L-ACNC: 0.22 UIU/ML (ref 0.36–3.74)

## 2019-11-11 PROCEDURE — 73562 X-RAY EXAM OF KNEE 3: CPT

## 2019-11-11 PROCEDURE — 82306 VITAMIN D 25 HYDROXY: CPT

## 2019-11-11 PROCEDURE — 83735 ASSAY OF MAGNESIUM: CPT

## 2019-11-11 PROCEDURE — 80053 COMPREHEN METABOLIC PANEL: CPT | Performed by: FAMILY MEDICINE

## 2019-11-11 PROCEDURE — 36415 COLL VENOUS BLD VENIPUNCTURE: CPT | Performed by: FAMILY MEDICINE

## 2019-11-11 PROCEDURE — 84443 ASSAY THYROID STIM HORMONE: CPT

## 2019-11-11 RX ORDER — LEVOTHYROXINE SODIUM 88 UG/1
88 TABLET ORAL DAILY
Qty: 30 TABLET | Refills: 1 | Status: SHIPPED | OUTPATIENT
Start: 2019-11-11 | End: 2019-12-09 | Stop reason: SDUPTHER

## 2019-11-11 NOTE — TELEPHONE ENCOUNTER
Please call Devin Martinez and let her know that her labs were normal, except her thyroid dose is too high, which could be causing her symptoms  I sent in a new Synthroid 88mcg instead of 100mcg and recheck TSH in 6 weeks, orders placed  Thank you! Appointment on 11/11/2019   Component Date Value Ref Range Status    TSH 3RD GENERATON 11/11/2019 0 216* 0 358 - 3 740 uIU/mL Final      The recommended reference ranges for TSH during pregnancy are as follows:   First trimester 0 1 to 2 5 uIU/mL   Second trimester  0 2 to 3 0 uIU/mL   Third trimester 0 3 to 3 0 uIU/m    Note: Normal ranges may not apply to patients who are transgender, non-binary, or whose legal sex, sex at birth, and gender identity differ  Using supplements with high doses of biotin 20 to more than 300 times greater than the adequate daily intake for adults of 30 mcg/day as established by the [employer] [employer], can cause falsely depress results   Magnesium 11/11/2019 1 7  1 6 - 2 6 mg/dL Final    Vit D, 25-Hydroxy 11/11/2019 30 1  30 0 - 100 0 ng/mL Final   Office Visit on 11/08/2019   Component Date Value Ref Range Status    Sodium 11/11/2019 142  136 - 145 mmol/L Final    Potassium 11/11/2019 4 1  3 5 - 5 3 mmol/L Final    Chloride 11/11/2019 110* 100 - 108 mmol/L Final    CO2 11/11/2019 26  21 - 32 mmol/L Final    ANION GAP 11/11/2019 6  4 - 13 mmol/L Final    BUN 11/11/2019 14  5 - 25 mg/dL Final    Creatinine 11/11/2019 0 72  0 60 - 1 30 mg/dL Final    Standardized to IDMS reference method    Glucose, Fasting 11/11/2019 81  65 - 99 mg/dL Final      Specimen collection should occur prior to Sulfasalazine administration due to the potential for falsely depressed results  Specimen collection should occur prior to Sulfapyridine administration due to the potential for falsely elevated results      Calcium 11/11/2019 9 9  8 3 - 10 1 mg/dL Final    AST 11/11/2019 22  5 - 45 U/L Final      Specimen collection should occur prior to Sulfasalazine administration due to the potential for falsely depressed results   ALT 11/11/2019 28  12 - 78 U/L Final      Specimen collection should occur prior to Sulfasalazine and/or Sulfapyridine administration due to the potential for falsely depressed results       Alkaline Phosphatase 11/11/2019 83  46 - 116 U/L Final    Total Protein 11/11/2019 7 5  6 4 - 8 2 g/dL Final    Albumin 11/11/2019 4 0  3 5 - 5 0 g/dL Final    Total Bilirubin 11/11/2019 0 48  0 20 - 1 00 mg/dL Final    eGFR 11/11/2019 88  ml/min/1 73sq m Final

## 2019-11-13 ENCOUNTER — IMMUNIZATIONS (OUTPATIENT)
Dept: FAMILY MEDICINE CLINIC | Facility: CLINIC | Age: 64
End: 2019-11-13
Payer: MEDICARE

## 2019-11-13 DIAGNOSIS — Z23 NEED FOR INFLUENZA VACCINATION: Primary | ICD-10-CM

## 2019-11-13 PROCEDURE — 90662 IIV NO PRSV INCREASED AG IM: CPT

## 2019-11-13 PROCEDURE — G0008 ADMIN INFLUENZA VIRUS VAC: HCPCS

## 2019-11-21 ENCOUNTER — OFFICE VISIT (OUTPATIENT)
Dept: FAMILY MEDICINE CLINIC | Facility: CLINIC | Age: 64
End: 2019-11-21
Payer: MEDICARE

## 2019-11-21 VITALS
SYSTOLIC BLOOD PRESSURE: 104 MMHG | TEMPERATURE: 97.6 F | BODY MASS INDEX: 30.08 KG/M2 | RESPIRATION RATE: 18 BRPM | HEIGHT: 68 IN | OXYGEN SATURATION: 93 % | HEART RATE: 96 BPM | DIASTOLIC BLOOD PRESSURE: 78 MMHG | WEIGHT: 198.5 LBS

## 2019-11-21 DIAGNOSIS — M17.0 PRIMARY OSTEOARTHRITIS OF BOTH KNEES: Primary | ICD-10-CM

## 2019-11-21 PROCEDURE — 99213 OFFICE O/P EST LOW 20 MIN: CPT | Performed by: FAMILY MEDICINE

## 2019-11-21 PROCEDURE — 20610 DRAIN/INJ JOINT/BURSA W/O US: CPT | Performed by: FAMILY MEDICINE

## 2019-11-21 PROCEDURE — 1124F ACP DISCUSS-NO DSCNMKR DOCD: CPT | Performed by: FAMILY MEDICINE

## 2019-11-21 RX ORDER — LEVOTHYROXINE SODIUM 0.1 MG/1
TABLET ORAL
Refills: 0 | COMMUNITY
Start: 2019-11-20 | End: 2019-12-09

## 2019-11-21 RX ORDER — METHYLPREDNISOLONE ACETATE 80 MG/ML
80 INJECTION, SUSPENSION INTRA-ARTICULAR; INTRALESIONAL; INTRAMUSCULAR; SOFT TISSUE ONCE
Status: COMPLETED | OUTPATIENT
Start: 2019-11-21 | End: 2019-11-21

## 2019-11-21 RX ADMIN — METHYLPREDNISOLONE ACETATE 80 MG: 80 INJECTION, SUSPENSION INTRA-ARTICULAR; INTRALESIONAL; INTRAMUSCULAR; SOFT TISSUE at 13:32

## 2019-11-21 NOTE — PROGRESS NOTES
FAMILY MEDICINE PROGRESS NOTE  Owen Raygoza 75 y o  female   DATE: November 21, 2019     ASSESSMENT and PLAN:  Owen Raygoza is a 75 y o  female with:     Problem List Items Addressed This Visit     None      Visit Diagnoses     Primary osteoarthritis of both knees    -  Primary      B/l OA of knees, severe in left knee and mild in right knee, discussed treatment options, pt doesn't have time to do PT, so gave exercises  CS injections performed as below, pt tolerated well  If it works can repeat in 2-3 months, if not can consider GelSyn, Simvisc, etc  May need Ortho eval for TKR, pt not ready at this time  Large joint arthrocentesis: R knee  Date/Time: 11/21/2019 9:26 AM  Consent given by: patient  Site marked: site marked  Timeout: Immediately prior to procedure a time out was called to verify the correct patient, procedure, equipment, support staff and site/side marked as required   Supporting Documentation  Indications: pain   Procedure Details  Location: knee - R knee  Preparation: Patient was prepped and draped in the usual sterile fashion  Needle size: 22 G  Ultrasound guidance: no  Approach: posterolateral  Medication group details: 0 5mL of 80mg/nl of methylprednisolone and 2mL of 1% Lidocaine  Large joint arthrocentesis: L knee  Date/Time: 11/21/2019 9:27 AM  Consent given by: patient  Site marked: site marked  Timeout: Immediately prior to procedure a time out was called to verify the correct patient, procedure, equipment, support staff and site/side marked as required   Supporting Documentation  Indications: pain   Procedure Details  Location: knee - L knee  Preparation: Patient was prepped and draped in the usual sterile fashion  Needle size: 22 G  Ultrasound guidance: no  Approach: posterolateral  Medication group details: 0 5mL of 80mg/nl of methylprednisolone and 2mL of 1% Lidocaine  Patient agreeable with the plan and expressed understanding    I discucssed signs and symptoms for which to RTC, go to ER or seek urgent medical care  SUBJECTIVE:  Naeem Raygoza is a 75 y o  female who presents today with a chief complaint of Knee Pain (Bilateral knee pain)  Pt is here for ongoing bilateral knee pain  Is considering knee injections, hasnt done PT yet  She is now much more active because she is taking care of her 10year old grandchild  She hasnt tried any interventions in the past  She had Xrays done on 11/11 that showed severe OA and mild OA of the right knee  She is noticing that her knees are more puffy and have been causing more pain  Knee Pain    The incident occurred more than 1 week ago  There was no injury mechanism  The pain is present in the left knee and right knee  The quality of the pain is described as aching  The pain has been constant since onset  Pertinent negatives include no inability to bear weight, loss of motion, loss of sensation, muscle weakness, numbness or tingling  She reports no foreign bodies present  The symptoms are aggravated by palpation, movement and weight bearing  She has tried NSAIDs, rest, immobilization and acetaminophen for the symptoms  The treatment provided no relief  Review of Systems   Neurological: Negative for tingling and numbness  I have reviewed the patient's Past Medical History  OBJECTIVE:  /78 (BP Location: Left arm, Patient Position: Sitting, Cuff Size: Large)   Pulse 96   Temp 97 6 °F (36 4 °C)   Resp 18   Ht 5' 8" (1 727 m)   Wt 90 kg (198 lb 8 oz)   SpO2 93%   Breastfeeding? No   BMI 30 18 kg/m²      Physical Exam   Constitutional: She appears well-developed and well-nourished  No distress  obese   Musculoskeletal:        Right knee: She exhibits decreased range of motion and swelling  Tenderness found  Medial joint line and lateral joint line tenderness noted  Left knee: She exhibits decreased range of motion and swelling  She exhibits no effusion, no deformity and no erythema  Tenderness found  Medial joint line and lateral joint line tenderness noted  Skin: She is not diaphoretic  Psychiatric: She has a normal mood and affect  Her speech is normal and behavior is normal  Judgment and thought content normal  Cognition and memory are normal  She expresses no homicidal and no suicidal ideation  Mehran Choudhary MD    Note: Portions of the record have been created with voice recognition software  Occasional wrong word or "sound a like" substitutions may have occurred due to the inherent limitations of voice recognition software  Read the chart carefully and recognize, using context, where substitutions have occurred

## 2019-12-09 DIAGNOSIS — E06.3 CHRONIC AUTOIMMUNE THYROIDITIS: ICD-10-CM

## 2019-12-09 RX ORDER — LEVOTHYROXINE SODIUM 88 UG/1
88 TABLET ORAL DAILY
Qty: 30 TABLET | Refills: 0 | Status: CANCELLED | OUTPATIENT
Start: 2019-12-09

## 2019-12-09 RX ORDER — LEVOTHYROXINE SODIUM 88 UG/1
88 TABLET ORAL DAILY
Qty: 30 TABLET | Refills: 0 | Status: SHIPPED | OUTPATIENT
Start: 2019-12-09 | End: 2019-12-11 | Stop reason: SDUPTHER

## 2019-12-09 NOTE — TELEPHONE ENCOUNTER
----- Message from Catarina Lu sent at 12/9/2019  5:32 AM EST -----  Regarding: Prescription Question  Contact: [phone]  I need refills on thyroid medicine please    also my knees feel great  Thank you   Catarina Lu

## 2019-12-09 NOTE — TELEPHONE ENCOUNTER
Glad the knees are doing great! Her thyroid med dose was adjusted after her abnormal test on 11/11/19, so she is due for repeat TSH in 2 weeks, once she gets that done and I know this dose is working, Snatch that Jerky send in the med with refills, but if it is still abnormal, then she'll need a different dose, that's why no refills for this one at this time

## 2019-12-10 ENCOUNTER — APPOINTMENT (OUTPATIENT)
Dept: LAB | Facility: MEDICAL CENTER | Age: 64
End: 2019-12-10
Payer: MEDICARE

## 2019-12-10 DIAGNOSIS — E06.3 CHRONIC AUTOIMMUNE THYROIDITIS: ICD-10-CM

## 2019-12-10 LAB — TSH SERPL DL<=0.05 MIU/L-ACNC: 0.53 UIU/ML (ref 0.36–3.74)

## 2019-12-10 PROCEDURE — 84443 ASSAY THYROID STIM HORMONE: CPT

## 2019-12-10 PROCEDURE — 36415 COLL VENOUS BLD VENIPUNCTURE: CPT

## 2019-12-11 DIAGNOSIS — E06.3 CHRONIC AUTOIMMUNE THYROIDITIS: ICD-10-CM

## 2019-12-11 RX ORDER — LEVOTHYROXINE SODIUM 88 UG/1
88 TABLET ORAL DAILY
Qty: 30 TABLET | Refills: 5 | Status: SHIPPED | OUTPATIENT
Start: 2019-12-11 | End: 2020-05-01 | Stop reason: SDUPTHER

## 2019-12-11 NOTE — TELEPHONE ENCOUNTER
I sent her a "[employer] message, her labs were normal, so I resent it in with refills

## 2019-12-18 ENCOUNTER — ANESTHESIA EVENT (OUTPATIENT)
Dept: GASTROENTEROLOGY | Facility: AMBULARY SURGERY CENTER | Age: 64
End: 2019-12-18

## 2019-12-18 ENCOUNTER — HOSPITAL ENCOUNTER (OUTPATIENT)
Dept: GASTROENTEROLOGY | Facility: AMBULARY SURGERY CENTER | Age: 64
Setting detail: OUTPATIENT SURGERY
Discharge: HOME | End: 2019-12-18
Attending: INTERNAL MEDICINE
Payer: MEDICARE

## 2019-12-18 ENCOUNTER — ANESTHESIA (OUTPATIENT)
Dept: GASTROENTEROLOGY | Facility: AMBULARY SURGERY CENTER | Age: 64
End: 2019-12-18

## 2019-12-18 VITALS
WEIGHT: 194 LBS | BODY MASS INDEX: 32.32 KG/M2 | OXYGEN SATURATION: 96 % | DIASTOLIC BLOOD PRESSURE: 86 MMHG | SYSTOLIC BLOOD PRESSURE: 136 MMHG | RESPIRATION RATE: 19 BRPM | TEMPERATURE: 97 F | HEART RATE: 69 BPM | HEIGHT: 65 IN

## 2019-12-18 DIAGNOSIS — Z12.11 SCREEN FOR COLON CANCER: ICD-10-CM

## 2019-12-18 DIAGNOSIS — K63.5 POLYP OF COLON, UNSPECIFIED PART OF COLON, UNSPECIFIED TYPE: ICD-10-CM

## 2019-12-18 PROCEDURE — 88305 TISSUE EXAM BY PATHOLOGIST: CPT | Performed by: PATHOLOGY

## 2019-12-18 PROCEDURE — 45380 COLONOSCOPY AND BIOPSY: CPT | Performed by: INTERNAL MEDICINE

## 2019-12-18 PROCEDURE — 45385 COLONOSCOPY W/LESION REMOVAL: CPT | Performed by: INTERNAL MEDICINE

## 2019-12-18 RX ORDER — PROPOFOL 10 MG/ML
INJECTION, EMULSION INTRAVENOUS AS NEEDED
Status: DISCONTINUED | OUTPATIENT
Start: 2019-12-18 | End: 2019-12-18 | Stop reason: SURG

## 2019-12-18 RX ORDER — SODIUM CHLORIDE, SODIUM LACTATE, POTASSIUM CHLORIDE, CALCIUM CHLORIDE 600; 310; 30; 20 MG/100ML; MG/100ML; MG/100ML; MG/100ML
INJECTION, SOLUTION INTRAVENOUS CONTINUOUS PRN
Status: DISCONTINUED | OUTPATIENT
Start: 2019-12-18 | End: 2019-12-18 | Stop reason: SURG

## 2019-12-18 RX ORDER — SODIUM CHLORIDE, SODIUM LACTATE, POTASSIUM CHLORIDE, CALCIUM CHLORIDE 600; 310; 30; 20 MG/100ML; MG/100ML; MG/100ML; MG/100ML
125 INJECTION, SOLUTION INTRAVENOUS CONTINUOUS
Status: CANCELLED | OUTPATIENT
Start: 2019-12-18

## 2019-12-18 RX ADMIN — PROPOFOL 50 MG: 10 INJECTION, EMULSION INTRAVENOUS at 10:51

## 2019-12-18 RX ADMIN — PROPOFOL 50 MG: 10 INJECTION, EMULSION INTRAVENOUS at 10:42

## 2019-12-18 RX ADMIN — PROPOFOL 50 MG: 10 INJECTION, EMULSION INTRAVENOUS at 10:44

## 2019-12-18 RX ADMIN — PROPOFOL 50 MG: 10 INJECTION, EMULSION INTRAVENOUS at 10:47

## 2019-12-18 RX ADMIN — PROPOFOL 100 MG: 10 INJECTION, EMULSION INTRAVENOUS at 10:40

## 2019-12-18 RX ADMIN — SODIUM CHLORIDE, SODIUM LACTATE, POTASSIUM CHLORIDE, AND CALCIUM CHLORIDE: .6; .31; .03; .02 INJECTION, SOLUTION INTRAVENOUS at 10:36

## 2019-12-18 NOTE — ANESTHESIA POSTPROCEDURE EVALUATION
Post-Op Assessment Note    CV Status:  Stable  Pain Score: 0    Pain management: adequate     Mental Status:  Alert and awake   Hydration Status:  Euvolemic   PONV Controlled:  Controlled   Airway Patency:  Patent   Post Op Vitals Reviewed: Yes      Staff: Anesthesiologist, CRNA           /69 (12/18/19 1103)    Temp (!) 97 °F (36 1 °C) (12/18/19 1103)    Pulse 80 (12/18/19 1103)   Resp 16 (12/18/19 1103)    SpO2 93 % (12/18/19 1103)

## 2019-12-18 NOTE — ANESTHESIA PREPROCEDURE EVALUATION
Review of Systems/Medical History  Patient summary reviewed    No history of anesthetic complications     Cardiovascular  Exercise tolerance (METS): >4,  No angina ,    Pulmonary  Smoker ex-smoker  , No shortness of breath, No recent URI ,        GI/Hepatic    No GERD , Liver disease (GUTIERREZ) ,        Chronic kidney disease ,        Endo/Other  History of thyroid disease , hypothyroidism,      GYN       Hematology   Musculoskeletal  Negative musculoskeletal ROS        Neurology  Negative neurology ROS      Psychology           Physical Exam    Airway    Mallampati score: II         Dental   No notable dental hx     Cardiovascular      Pulmonary      Other Findings        Anesthesia Plan  ASA Score- 2     Anesthesia Type- IV sedation with anesthesia with ASA Monitors  Additional Monitors:   Airway Plan:         Plan Factors-    Induction- intravenous  Postoperative Plan-     Informed Consent- Anesthetic plan and risks discussed with patient  I personally reviewed this patient with the CRNA  Discussed and agreed on the Anesthesia Plan with the CRNA  Lacy Joshi

## 2019-12-18 NOTE — H&P
History and Physical -  [facility]  Rocky Brooks 75 y o  female MRN: [id number]    HPI: Rocky Brooks is a 75y o  year old female who presents for colon cancer screening        Review of Systems    Historical Information   Past Medical History:   Diagnosis Date    Basal cell carcinoma 2016    Colon polyp     Fatty liver     Hypothyroidism     Lichen sclerosus     Miscarriage     Obesity     Sebaceous cyst 2012    of Scalp     Stage 2 chronic kidney disease     Vitamin D deficiency      Past Surgical History:   Procedure Laterality Date    CHOLECYSTECTOMY      2014    EYE SURGERY      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    HEEL SPUR SURGERY      2012    MALIGNANT SKIN LESION EXCISION  2016    POLYPECTOMY      2012    PROSTHODONTIC PROCEDURE      TOOTH EXTRACTION       Social History   Social History     Substance and Sexual Activity   Alcohol Use No    Binge frequency: Less than monthly     Social History     Substance and Sexual Activity   Drug Use No     Social History     Tobacco Use   Smoking Status Former Smoker    Packs/day: 0 25    Types: Cigarettes    Start date: 8/16/2019   Smokeless Tobacco Never Used     Family History   Problem Relation Age of Onset    No Known Problems Mother     No Known Problems Father     No Known Problems Brother     No Known Problems Daughter     No Known Problems Son     No Known Problems Maternal Grandmother     No Known Problems Maternal Grandfather     No Known Problems Paternal Grandmother     No Known Problems Paternal Grandfather     No Known Problems Daughter     No Known Problems Daughter     Congenital heart disease Daughter     SIDS Daughter        Meds/Allergies       (Not in a hospital admission)    No Known Allergies    Objective     /80   Pulse 69   Temp (!) 96 8 °F (36 °C) (Temporal)   Resp 18   Ht 5' 5" (1 651 m)   Wt 88 kg (194 lb)   SpO2 97%   BMI 32 28 kg/m²       PHYSICAL EXAM    Gen: NAD  CV: RRR  CHEST: Clear  ABD: soft, NT/ND  EXT: no edema  Neuro: AAO      ASSESSMENT/PLAN:  This is a 75y o  year old female here for colon cancer screening  PLAN:   Procedure:  Colonoscopy

## 2019-12-31 ENCOUNTER — TELEPHONE (OUTPATIENT)
Dept: GASTROENTEROLOGY | Facility: CLINIC | Age: 64
End: 2019-12-31

## 2019-12-31 NOTE — TELEPHONE ENCOUNTER
----- Message from Luis A Wilcox MD sent at 12/30/2019  4:41 PM EST -----  Please inform the patient that the polyps removed were tubular adenomas, would recommend repeat colonoscopy in 5 years

## 2019-12-31 NOTE — LETTER
December 31, 2019     Springer Copping  [address] [facility]  [place] [id number]      Dear Ms Alcala Drilling: We have attempted to reach you regarding your results  We ask that you please contact our office upon receipt of this letter to receive your results  Thank you in advance for your cooperation and assistance          Sincerely,   Akiko Montenegro [facility]  [phone]

## 2020-01-07 ENCOUNTER — OFFICE VISIT (OUTPATIENT)
Dept: URGENT CARE | Facility: MEDICAL CENTER | Age: 65
End: 2020-01-07
Payer: MEDICARE

## 2020-01-07 VITALS
RESPIRATION RATE: 20 BRPM | HEART RATE: 76 BPM | SYSTOLIC BLOOD PRESSURE: 122 MMHG | BODY MASS INDEX: 29.73 KG/M2 | OXYGEN SATURATION: 95 % | HEIGHT: 66 IN | WEIGHT: 185 LBS | TEMPERATURE: 97.8 F | DIASTOLIC BLOOD PRESSURE: 86 MMHG

## 2020-01-07 DIAGNOSIS — M54.2 NECK PAIN: Primary | ICD-10-CM

## 2020-01-07 PROCEDURE — G0463 HOSPITAL OUTPT CLINIC VISIT: HCPCS | Performed by: PHYSICIAN ASSISTANT

## 2020-01-07 PROCEDURE — 99213 OFFICE O/P EST LOW 20 MIN: CPT | Performed by: PHYSICIAN ASSISTANT

## 2020-01-07 RX ORDER — CYCLOBENZAPRINE HCL 10 MG
10 TABLET ORAL 3 TIMES DAILY PRN
Qty: 20 TABLET | Refills: 0 | Status: SHIPPED | OUTPATIENT
Start: 2020-01-07 | End: 2020-01-30

## 2020-01-07 NOTE — PROGRESS NOTES
[facility]        NAME: Owen Raygoza is a 75 y o  female  : 1955    MRN: [id number]  DATE: 2020  TIME: 10:47 AM    Assessment and Plan   Neck pain [M54 2]  1  Neck pain  cyclobenzaprine (FLEXERIL) 10 mg tablet         Patient Instructions       Advised patient to continue Motrin daily every 6-8 hours   advised using heat 15-20 minutes at a time 3-4 times daily   advised using muscle relaxers at night   follow-up with PCP if symptoms do not improve  Chief Complaint     Chief Complaint   Patient presents with    Neck Pain     x 3days , taking ibuprofen for same   was sick last week  History of Present Illness        Patient is a 78-year-old female who presents today with neck pain  This started over the past 3 days  She denies any injury or history of cervical spine issues  Patient was recently sick with the flu last week, her symptoms have now resolved  No fevers  She reports pain when moving her neck certain ways  She has been using ibuprofen with little relief of symptoms  Review of Systems   Review of Systems   Constitutional: Negative for fever  Respiratory: Negative for shortness of breath  Cardiovascular: Negative for chest pain  Musculoskeletal: Positive for arthralgias  Skin: Negative for color change           Current Medications       Current Outpatient Medications:     B Complex Vitamins (B COMPLEX PO), Take by mouth, Disp: , Rfl:     cholecalciferol (VITAMIN D3) 1,000 units tablet, Take 2 tablets (2,000 Units total) by mouth daily, Disp: 60 tablet, Rfl: 1    cyclobenzaprine (FLEXERIL) 10 mg tablet, Take 1 tablet (10 mg total) by mouth 3 (three) times a day as needed for muscle spasms, Disp: 20 tablet, Rfl: 0    fluocinonide (LIDEX) 0 05 % cream, Apply topically 2 (two) times a week, Disp: 30 g, Rfl: 6    levothyroxine 88 mcg tablet, Take 1 tablet (88 mcg total) by mouth daily, Disp: 30 tablet, Rfl: 5    Multiple Vitamins-Minerals (MULTIPLE VITAMINS/WOMENS) tablet, Take 1 tablet by mouth daily, Disp: , Rfl:     Current Allergies     Allergies as of 01/07/2020    (No Known Allergies)            The following portions of the patient's history were reviewed and updated as appropriate: allergies, current medications, past family history, past medical history, past social history, past surgical history and problem list      Past Medical History:   Diagnosis Date    Basal cell carcinoma 2016    Colon polyp     Fatty liver     Hypothyroidism     Lichen sclerosus     Miscarriage     Obesity     Sebaceous cyst 2012    of Scalp     Stage 2 chronic kidney disease     Vitamin D deficiency        Past Surgical History:   Procedure Laterality Date    CHOLECYSTECTOMY      2014    EYE SURGERY      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    HEEL SPUR SURGERY      2012    MALIGNANT SKIN LESION EXCISION  2016    POLYPECTOMY      2012    PROSTHODONTIC PROCEDURE      TOOTH EXTRACTION         Family History   Problem Relation Age of Onset    No Known Problems Mother     No Known Problems Father     No Known Problems Brother     No Known Problems Daughter     No Known Problems Son     No Known Problems Maternal Grandmother     No Known Problems Maternal Grandfather     No Known Problems Paternal Grandmother     No Known Problems Paternal Grandfather     No Known Problems Daughter     No Known Problems Daughter     Congenital heart disease Daughter     SIDS Daughter          Medications have been verified  Objective   /86   Pulse 76   Temp 97 8 °F (36 6 °C)   Resp 20   Ht 5' 6" (1 676 m)   Wt 83 9 kg (185 lb)   SpO2 95%   BMI 29 86 kg/m²        Physical Exam     Physical Exam   Constitutional: She appears well-developed and well-nourished  Neck: Neck supple  Muscular tenderness present  No spinous process tenderness present  No neck rigidity  Decreased range of motion present   No edema and no erythema present  TTP  Over bilateral trapezius muscles   no focal bony tenderness   Cardiovascular: Normal rate and regular rhythm  Pulmonary/Chest: Effort normal and breath sounds normal    Musculoskeletal: She exhibits tenderness  She exhibits no edema or deformity  Skin: Skin is warm and dry

## 2020-01-30 ENCOUNTER — OFFICE VISIT (OUTPATIENT)
Dept: FAMILY MEDICINE CLINIC | Facility: CLINIC | Age: 65
End: 2020-01-30
Payer: MEDICARE

## 2020-01-30 VITALS
BODY MASS INDEX: 33.82 KG/M2 | TEMPERATURE: 97.2 F | WEIGHT: 203 LBS | HEART RATE: 96 BPM | OXYGEN SATURATION: 97 % | SYSTOLIC BLOOD PRESSURE: 110 MMHG | RESPIRATION RATE: 16 BRPM | DIASTOLIC BLOOD PRESSURE: 86 MMHG | HEIGHT: 65 IN

## 2020-01-30 DIAGNOSIS — M54.2 CERVICAL MUSCLE PAIN: Primary | ICD-10-CM

## 2020-01-30 DIAGNOSIS — Z12.39 SCREENING FOR BREAST CANCER: ICD-10-CM

## 2020-01-30 DIAGNOSIS — M17.0 PRIMARY OSTEOARTHRITIS OF BOTH KNEES: ICD-10-CM

## 2020-01-30 PROCEDURE — 99214 OFFICE O/P EST MOD 30 MIN: CPT | Performed by: FAMILY MEDICINE

## 2020-01-30 RX ORDER — NAPROXEN 500 MG/1
500 TABLET ORAL 2 TIMES DAILY WITH MEALS
Qty: 20 TABLET | Refills: 0 | Status: SHIPPED | OUTPATIENT
Start: 2020-01-30 | End: 2021-02-08 | Stop reason: SDUPTHER

## 2020-01-30 RX ORDER — VIT A/VIT C/VIT E/ZINC/COPPER 7160-113
TABLET, DELAYED RELEASE (ENTERIC COATED) ORAL DAILY
COMMUNITY

## 2020-01-30 NOTE — PROGRESS NOTES
FAMILY MEDICINE PROGRESS NOTE  Mally Carney 75 y o  female   DATE: January 30, 2020     ASSESSMENT and PLAN:  Mally Carney is a 75 y o  female with:     1  Cervical muscle pain  No improvement with Flexeril PRN, likely more sprained muscular pain, no evidence of muscle spasm on exam  Add scheduled Naproxen x 5 days, then PRN  If persists past 10 days, start PT  Obtain Xray from chiropractor  Given handout on exercises  - naproxen (NAPROSYN) 500 mg tablet; Take 1 tablet (500 mg total) by mouth 2 (two) times a day with meals for 10 days  Dispense: 20 tablet; Refill: 0    2  Primary osteoarthritis of both knees  Significant improvement with bilateral CS shots  3  Screening for breast cancer  Advised to wait until after 2/27/2020 for next mammogram due to insurance coverage    - Mammo screening bilateral w cad; Future    Patient agreeable with the plan and expressed understanding  I discucssed signs and symptoms for which to RTC, go to ER or seek urgent medical care  SUBJECTIVE:  Mally Carney is a 75 y o  female who presents today with a chief complaint of Neck Pain   Pt is here for urgent care follow up, she was seen on 1/7/2020 for neck pain and took Flexeril, but it didn't help her symptoms, it just made her sleepy  She states initially the neck pain was excruciating, she couldn't hold her head up due to the pain, but that has slowly improved  She still has the pain, but not nearly as severe as before  She states the only thing helping her pain is that she is "popping Ibuprofen like candy," which helps quite a bit, but then it wears off  Denies any inciting event/injury  She then went to the chiropractor and had an Xray and showed 2 collapsed vertebrae, the manipulation helped temporarily, but has persisted  She denies fevers/chills, neck stiffness, visual difficulties, numbness/tingling in her arms       Also states that the CS shots she had for her bilateral knees have worked great and she no longer has any pain  Also saw in her MyChart that she is due for her mammogram, so is requesting an order for that  Review of Systems   Constitutional: Negative for chills and fever  Musculoskeletal: Positive for myalgias and neck pain  Negative for arthralgias, back pain and neck stiffness  Neurological: Negative for numbness  I have reviewed the patient's Past Medical History  OBJECTIVE:  /86   Pulse 96   Temp (!) 97 2 °F (36 2 °C)   Resp 16   Ht 5' 5 4" (1 661 m)   Wt 92 1 kg (203 lb)   SpO2 97%   BMI 33 37 kg/m²    Physical Exam   Constitutional: She appears well-developed and well-nourished  No distress  Neck: Normal range of motion  Neck supple  Muscular tenderness present  No spinous process tenderness present  No neck rigidity  No edema, no erythema and normal range of motion present  Musculoskeletal:        Cervical back: She exhibits tenderness  She exhibits normal range of motion, no bony tenderness, no swelling, no edema, no deformity, no laceration, no pain and no spasm  Skin: She is not diaphoretic  Vitals reviewed  Gorge Burgos MD    Note: Portions of the record have been created with voice recognition software  Occasional wrong word or "sound a like" substitutions may have occurred due to the inherent limitations of voice recognition software  Read the chart carefully and recognize, using context, where substitutions have occurred

## 2020-01-30 NOTE — PATIENT INSTRUCTIONS
Neck Exercises   AMBULATORY CARE:   Neck exercises  help reduce neck pain, and improve neck movement and strength  Neck exercises also help prevent long-term neck problems  What you need to know about neck exercises:   · Do the exercises every day,  or as often as directed by your healthcare provider  · Move slowly, gently, and smoothly  Avoid fast or jerky motions  · Stand and sit the way your healthcare provider shows you  Good posture may reduce your neck pain  Check your posture often, even when you are not doing your neck exercises  How to perform neck exercises safely:   · Exercise position:  You may sit or stand while you do neck exercises  Face forward  Your shoulders should be straight and relaxed, with a good posture  · Head tilts, forward and back:  Gently bow your head and try to touch your chin to your chest  Your healthcare provider may tell you to push on the back of your neck to help bow your head  Raise your chin back to the starting position  Tilt your head back as far as possible so you are looking up at the ceiling  Your healthcare provider may tell you to lift your chin to help tilt your head back  Return your head to the starting position  · Head tilts, side to side:  Tilt your head, bringing your ear toward your shoulder  Then tilt your head toward the other shoulder  · Head turns:  Turn your head to look over your shoulder  Tilt your chin down and try to touch it to your shoulder  Do not raise your shoulder to your chin  Face forward again  Do the same on the other side  · Head rolls:  Slowly bring your chin toward your chest  Next, roll your head to the right  Your ear should be positioned over your shoulder  Hold this position for 5 seconds  Roll your head back toward your chest and to the left into the same position  Hold for 5 seconds  Gently roll your head back and around in a clockwise [place] 3 times   Next, move your head in the reverse direction (counterclockwise) in a [place] 3 times  Do not shrug your shoulders upwards while you do this exercise  Contact your healthcare provider if:   · Your pain does not get better, or gets worse  · You have questions or concerns about your condition, care, or exercise program   © 2017 [address] Information is for End User's use only and may not be sold, redistributed or otherwise used for commercial purposes  All illustrations and images included in CareNotes® are the copyrighted property of [employer]  or [place]  The above information is an  only  It is not intended as medical advice for individual conditions or treatments  Talk to your doctor, nurse or pharmacist before following any medical regimen to see if it is safe and effective for you

## 2020-03-12 ENCOUNTER — HOSPITAL ENCOUNTER (OUTPATIENT)
Dept: RADIOLOGY | Facility: MEDICAL CENTER | Age: 65
Discharge: HOME | End: 2020-03-12
Payer: MEDICARE

## 2020-03-12 VITALS — HEIGHT: 65 IN | BODY MASS INDEX: 33.82 KG/M2 | WEIGHT: 203 LBS

## 2020-03-12 DIAGNOSIS — Z12.39 SCREENING FOR BREAST CANCER: ICD-10-CM

## 2020-03-12 PROCEDURE — 77067 SCR MAMMO BI INCL CAD: CPT

## 2020-05-01 ENCOUNTER — PROCEDURE VISIT (OUTPATIENT)
Dept: FAMILY MEDICINE CLINIC | Facility: CLINIC | Age: 65
End: 2020-05-01
Payer: MEDICARE

## 2020-05-01 VITALS
TEMPERATURE: 98.7 F | RESPIRATION RATE: 16 BRPM | HEART RATE: 98 BPM | DIASTOLIC BLOOD PRESSURE: 80 MMHG | OXYGEN SATURATION: 98 % | SYSTOLIC BLOOD PRESSURE: 132 MMHG

## 2020-05-01 DIAGNOSIS — M17.12 PRIMARY OSTEOARTHRITIS OF LEFT KNEE: Primary | ICD-10-CM

## 2020-05-01 DIAGNOSIS — E06.3 CHRONIC AUTOIMMUNE THYROIDITIS: ICD-10-CM

## 2020-05-01 PROCEDURE — 1160F RVW MEDS BY RX/DR IN RCRD: CPT | Performed by: FAMILY MEDICINE

## 2020-05-01 PROCEDURE — 20610 DRAIN/INJ JOINT/BURSA W/O US: CPT | Performed by: FAMILY MEDICINE

## 2020-05-01 PROCEDURE — 1036F TOBACCO NON-USER: CPT | Performed by: FAMILY MEDICINE

## 2020-05-01 PROCEDURE — 99213 OFFICE O/P EST LOW 20 MIN: CPT | Performed by: FAMILY MEDICINE

## 2020-05-01 PROCEDURE — 4040F PNEUMOC VAC/ADMIN/RCVD: CPT | Performed by: FAMILY MEDICINE

## 2020-05-01 RX ORDER — METHYLPREDNISOLONE ACETATE 80 MG/ML
80 INJECTION, SUSPENSION INTRA-ARTICULAR; INTRALESIONAL; INTRAMUSCULAR; SOFT TISSUE ONCE
Status: COMPLETED | OUTPATIENT
Start: 2020-05-01 | End: 2020-05-01

## 2020-05-01 RX ORDER — LEVOTHYROXINE SODIUM 88 UG/1
88 TABLET ORAL DAILY
Qty: 90 TABLET | Refills: 1 | Status: SHIPPED | OUTPATIENT
Start: 2020-05-01 | End: 2020-10-26 | Stop reason: SDUPTHER

## 2020-05-01 RX ADMIN — METHYLPREDNISOLONE ACETATE 80 MG: 80 INJECTION, SUSPENSION INTRA-ARTICULAR; INTRALESIONAL; INTRAMUSCULAR; SOFT TISSUE at 16:15

## 2020-08-20 DIAGNOSIS — L90.0 LICHEN SCLEROSUS: ICD-10-CM

## 2020-10-24 DIAGNOSIS — E06.3 CHRONIC AUTOIMMUNE THYROIDITIS: ICD-10-CM

## 2020-10-26 DIAGNOSIS — E06.3 CHRONIC AUTOIMMUNE THYROIDITIS: ICD-10-CM

## 2020-10-26 RX ORDER — LEVOTHYROXINE SODIUM 88 UG/1
88 TABLET ORAL DAILY
Qty: 90 TABLET | Refills: 0 | OUTPATIENT
Start: 2020-10-26

## 2020-10-26 RX ORDER — LEVOTHYROXINE SODIUM 88 UG/1
88 TABLET ORAL DAILY
Qty: 90 TABLET | Refills: 0 | Status: SHIPPED | OUTPATIENT
Start: 2020-10-26 | End: 2021-01-07 | Stop reason: SDUPTHER

## 2020-11-05 ENCOUNTER — OFFICE VISIT (OUTPATIENT)
Dept: FAMILY MEDICINE CLINIC | Facility: CLINIC | Age: 65
End: 2020-11-05
Payer: MEDICARE

## 2020-11-05 VITALS
HEART RATE: 76 BPM | HEIGHT: 66 IN | DIASTOLIC BLOOD PRESSURE: 64 MMHG | WEIGHT: 214 LBS | SYSTOLIC BLOOD PRESSURE: 112 MMHG | BODY MASS INDEX: 34.39 KG/M2 | TEMPERATURE: 97 F | RESPIRATION RATE: 16 BRPM

## 2020-11-05 DIAGNOSIS — Z23 NEED FOR INFLUENZA VACCINATION: ICD-10-CM

## 2020-11-05 DIAGNOSIS — K76.0 FATTY LIVER: Primary | ICD-10-CM

## 2020-11-05 DIAGNOSIS — E06.3 CHRONIC AUTOIMMUNE THYROIDITIS: ICD-10-CM

## 2020-11-05 DIAGNOSIS — Z12.31 ENCOUNTER FOR SCREENING MAMMOGRAM FOR BREAST CANCER: ICD-10-CM

## 2020-11-05 DIAGNOSIS — M17.0 PRIMARY OSTEOARTHRITIS OF BOTH KNEES: ICD-10-CM

## 2020-11-05 DIAGNOSIS — E55.9 VITAMIN D DEFICIENCY: ICD-10-CM

## 2020-11-05 DIAGNOSIS — E66.09 CLASS 2 OBESITY DUE TO EXCESS CALORIES WITHOUT SERIOUS COMORBIDITY WITH BODY MASS INDEX (BMI) OF 35.0 TO 35.9 IN ADULT: ICD-10-CM

## 2020-11-05 DIAGNOSIS — Z00.00 MEDICARE ANNUAL WELLNESS VISIT, SUBSEQUENT: ICD-10-CM

## 2020-11-05 PROBLEM — E66.812 CLASS 2 OBESITY DUE TO EXCESS CALORIES WITHOUT SERIOUS COMORBIDITY WITH BODY MASS INDEX (BMI) OF 35.0 TO 35.9 IN ADULT: Status: ACTIVE | Noted: 2018-04-10

## 2020-11-05 PROCEDURE — 99213 OFFICE O/P EST LOW 20 MIN: CPT | Performed by: FAMILY MEDICINE

## 2020-11-05 PROCEDURE — 90662 IIV NO PRSV INCREASED AG IM: CPT

## 2020-11-05 PROCEDURE — G0438 PPPS, INITIAL VISIT: HCPCS | Performed by: FAMILY MEDICINE

## 2020-11-05 PROCEDURE — G0008 ADMIN INFLUENZA VIRUS VAC: HCPCS

## 2020-11-06 ENCOUNTER — LAB (OUTPATIENT)
Dept: LAB | Facility: MEDICAL CENTER | Age: 65
End: 2020-11-06
Payer: MEDICARE

## 2020-11-06 ENCOUNTER — TELEPHONE (OUTPATIENT)
Dept: FAMILY MEDICINE CLINIC | Facility: CLINIC | Age: 65
End: 2020-11-06

## 2020-11-06 DIAGNOSIS — E66.09 CLASS 2 OBESITY DUE TO EXCESS CALORIES WITHOUT SERIOUS COMORBIDITY WITH BODY MASS INDEX (BMI) OF 35.0 TO 35.9 IN ADULT: ICD-10-CM

## 2020-11-06 DIAGNOSIS — E55.9 VITAMIN D DEFICIENCY: ICD-10-CM

## 2020-11-06 DIAGNOSIS — E06.3 CHRONIC AUTOIMMUNE THYROIDITIS: ICD-10-CM

## 2020-11-06 DIAGNOSIS — K76.0 FATTY LIVER: ICD-10-CM

## 2020-11-06 LAB
25(OH)D3 SERPL-MCNC: 19.8 NG/ML (ref 30–100)
ALBUMIN SERPL BCP-MCNC: 4 G/DL (ref 3.5–5)
ALP SERPL-CCNC: 94 U/L (ref 46–116)
ALT SERPL W P-5'-P-CCNC: 29 U/L (ref 12–78)
ANION GAP SERPL CALCULATED.3IONS-SCNC: 3 MMOL/L (ref 4–13)
AST SERPL W P-5'-P-CCNC: 27 U/L (ref 5–45)
BILIRUB SERPL-MCNC: 0.55 MG/DL (ref 0.2–1)
BUN SERPL-MCNC: 12 MG/DL (ref 5–25)
CALCIUM SERPL-MCNC: 9.8 MG/DL (ref 8.3–10.1)
CHLORIDE SERPL-SCNC: 107 MMOL/L (ref 100–108)
CHOLEST SERPL-MCNC: 151 MG/DL (ref 50–200)
CO2 SERPL-SCNC: 28 MMOL/L (ref 21–32)
CREAT SERPL-MCNC: 0.82 MG/DL (ref 0.6–1.3)
GFR SERPL CREATININE-BSD FRML MDRD: 74 ML/MIN/1.73SQ M
GLUCOSE P FAST SERPL-MCNC: 92 MG/DL (ref 65–99)
HDLC SERPL-MCNC: 69 MG/DL
LDLC SERPL CALC-MCNC: 62 MG/DL (ref 0–100)
POTASSIUM SERPL-SCNC: 4.8 MMOL/L (ref 3.5–5.3)
PROT SERPL-MCNC: 7.7 G/DL (ref 6.4–8.2)
SODIUM SERPL-SCNC: 138 MMOL/L (ref 136–145)
TRIGL SERPL-MCNC: 98 MG/DL
TSH SERPL DL<=0.05 MIU/L-ACNC: 11.8 UIU/ML (ref 0.36–3.74)

## 2020-11-06 PROCEDURE — 84443 ASSAY THYROID STIM HORMONE: CPT

## 2020-11-06 PROCEDURE — 82306 VITAMIN D 25 HYDROXY: CPT

## 2020-11-06 PROCEDURE — 36415 COLL VENOUS BLD VENIPUNCTURE: CPT

## 2020-11-06 PROCEDURE — 80053 COMPREHEN METABOLIC PANEL: CPT

## 2020-11-06 PROCEDURE — 80061 LIPID PANEL: CPT

## 2020-12-01 DIAGNOSIS — L90.0 LICHEN SCLEROSUS: ICD-10-CM

## 2020-12-02 DIAGNOSIS — L90.0 LICHEN SCLEROSUS: ICD-10-CM

## 2020-12-02 NOTE — TELEPHONE ENCOUNTER
I don't know why she received that message, no request was made by [employer] that I can see  Refill sent

## 2020-12-02 NOTE — TELEPHONE ENCOUNTER
Patient called wanting to know why medication was denied for Fluocinonide  Please advise  She stated she got a notification on App in the [employer]

## 2021-01-05 ENCOUNTER — TELEPHONE (OUTPATIENT)
Dept: FAMILY MEDICINE CLINIC | Facility: CLINIC | Age: 66
End: 2021-01-05

## 2021-01-05 DIAGNOSIS — E06.3 CHRONIC AUTOIMMUNE THYROIDITIS: Primary | ICD-10-CM

## 2021-01-05 NOTE — TELEPHONE ENCOUNTER
Patient called requesting TSH lab order  She stated she was to check it again in 6 weeks  Please call patient when order is in chart

## 2021-01-07 ENCOUNTER — LAB (OUTPATIENT)
Dept: LAB | Facility: MEDICAL CENTER | Age: 66
End: 2021-01-07
Payer: MEDICARE

## 2021-01-07 DIAGNOSIS — L90.0 LICHEN SCLEROSUS: ICD-10-CM

## 2021-01-07 DIAGNOSIS — E06.3 CHRONIC AUTOIMMUNE THYROIDITIS: ICD-10-CM

## 2021-01-07 LAB — TSH SERPL DL<=0.05 MIU/L-ACNC: 0.39 UIU/ML (ref 0.36–3.74)

## 2021-01-07 PROCEDURE — 36415 COLL VENOUS BLD VENIPUNCTURE: CPT

## 2021-01-07 PROCEDURE — 84443 ASSAY THYROID STIM HORMONE: CPT

## 2021-01-08 RX ORDER — LEVOTHYROXINE SODIUM 88 UG/1
88 TABLET ORAL DAILY
Qty: 90 TABLET | Refills: 0 | Status: SHIPPED | OUTPATIENT
Start: 2021-01-08 | End: 2021-01-25

## 2021-01-24 DIAGNOSIS — E06.3 CHRONIC AUTOIMMUNE THYROIDITIS: ICD-10-CM

## 2021-01-25 RX ORDER — LEVOTHYROXINE SODIUM 88 UG/1
TABLET ORAL
Qty: 124 TABLET | Refills: 1 | Status: SHIPPED | OUTPATIENT
Start: 2021-01-25 | End: 2021-03-30 | Stop reason: SDUPTHER

## 2021-01-25 NOTE — TELEPHONE ENCOUNTER
Patient states she requested refill from pharmacy because you increased her dose to 1 88mcg  M-T-W-F-Sat and 2- 88mcg tabs on Sunday and Thursday  Please refill with new directions to reflect updated dose

## 2021-02-08 DIAGNOSIS — M54.2 CERVICAL MUSCLE PAIN: ICD-10-CM

## 2021-02-08 DIAGNOSIS — L90.0 LICHEN SCLEROSUS: ICD-10-CM

## 2021-02-08 RX ORDER — NAPROXEN 500 MG/1
500 TABLET ORAL 2 TIMES DAILY WITH MEALS
Qty: 20 TABLET | Refills: 0 | Status: SHIPPED | OUTPATIENT
Start: 2021-02-08 | End: 2021-05-14 | Stop reason: SDUPTHER

## 2021-02-22 NOTE — PROGRESS NOTES
FAMILY MEDICINE PROGRESS NOTE    Date of Service: 21  Primary Care Provider:   Geovanny Cary MD       Name: Tata Becerril       : 1955       Age:66 y o  Sex: female      MRN: [id number]      Chief Complaint:Knee Pain (Bilateral)       ASSESSMENT and PLAN:  Tata Becerril is a 74 y o  female with:     Problem List Items Addressed This Visit        Musculoskeletal and Integument    Primary osteoarthritis of both knees - Primary    Relevant Medications    triamcinolone acetonide (KENALOG-10) 10 mg/mL injection 10 mg (Start on 2021 10:45 AM)        Large joint arthrocentesis: bilateral knee  Universal Protocol:  Consent: Verbal consent obtained  Risks and benefits: risks, benefits and alternatives were discussed  Consent given by: patient  Time out: Immediately prior to procedure a "time out" was called to verify the correct patient, procedure, equipment, support staff and site/side marked as required  Patient understanding: patient states understanding of the procedure being performed  Patient consent: the patient's understanding of the procedure matches consent given  Procedure consent: procedure consent matches procedure scheduled  Relevant documents: relevant documents not present or verified  Test results: test results not available  Site marked: the operative site was marked  Radiology Images displayed and confirmed   If images not available, report reviewed: imaging studies not available  Required items: required blood products, implants, devices, and special equipment available  Patient identity confirmed: verbally with patient    Supporting Documentation  Indications: pain   Procedure Details  Location: knee - bilateral knee  Preparation: Patient was prepped and draped in the usual sterile fashion  Needle size: 22 G  Ultrasound guidance: no  Approach: anterolateral    Patient tolerance: patient tolerated the procedure well with no immediate complications    Injected bilateral knees with 4 ml lidocaine and 1 mL Kenalog          SUBJECTIVE:  Tata Becerril is a 74 y o  female who presents today with a chief complaint of Knee Pain (Bilateral)  HPI     She has had cortisone injections in her knees in the past which have been helpful  She reports that when she wakes up in the night and straightens out her legs her knees hurt, going down stairs hurt  She will take Aleve for pain  Review of Systems   Musculoskeletal: Positive for arthralgias and gait problem  I have reviewed the patient's Past Medical History  Current Outpatient Medications:     B Complex Vitamins (B COMPLEX PO), Take by mouth daily , Disp: , Rfl:     cholecalciferol (VITAMIN D3) 1,000 units tablet, Take 2 tablets (2,000 Units total) by mouth daily, Disp: 60 tablet, Rfl: 1    fluocinonide (LIDEX) 0 05 % cream, Apply topically 2 (two) times a week, Disp: 30 g, Rfl: 0    levothyroxine 88 mcg tablet, Take 1 tablet (88 mcg total) by mouth see administration instructions AND 2 tablets (176 mcg total) see administration instructions  Take 1 tablet on Monday, Tuesday, Wednesday, Friday & Saturday  Take 2 tablets on Sunday and Thursday    (Patient taking differently: Take 1 tablet (88 mcg total) by mouth see administration instructions AND 2 tablets (176 mcg total) see administration instructions  Take 1 tablet on Sunday, Tuesday, Wednesday, Friday & Saturday  Take 2 tablets on Mondays and Thursday  Belinda Conti ), Disp: 124 tablet, Rfl: 1    Multiple Vitamins-Minerals (ICAPS) TABS, Take by mouth daily 2 caps daily, Disp: , Rfl:     Multiple Vitamins-Minerals (MULTIPLE VITAMINS/WOMENS) tablet, Take 1 tablet by mouth daily, Disp: , Rfl:     naproxen (NAPROSYN) 500 mg tablet, Take 1 tablet (500 mg total) by mouth 2 (two) times a day with meals for 10 days (Patient not taking: Reported on 2/23/2021), Disp: 20 tablet, Rfl: 0    Current Facility-Administered Medications:     triamcinolone acetonide (KENALOG-10) 10 mg/mL injection 10 mg, 10 mg, Intra-articular, Once, Alicia Martinez MD    OBJECTIVE:  BP 98/60   Pulse 78   Temp (!) 96 1 °F (35 6 °C)   Resp 14   Ht 5' 6" (1 676 m)   Wt 98 4 kg (217 lb)   BMI 35 02 kg/m²    BP Readings from Last 3 Encounters:   02/23/21 98/60   11/05/20 112/64   05/01/20 132/80      Wt Readings from Last 3 Encounters:   02/23/21 98 4 kg (217 lb)   11/05/20 97 1 kg (214 lb)   03/12/20 92 1 kg (203 lb)      Physical Exam  Musculoskeletal:      Right knee: She exhibits swelling and effusion  She exhibits normal range of motion  Tenderness found  Medial joint line and lateral joint line tenderness noted  Left knee: She exhibits normal range of motion, no swelling and no effusion  Tenderness found  Medial joint line tenderness noted  Return if symptoms worsen or fail to improve  Alicia Martinez MD    Note: Portions of the record have been created with voice recognition software  Occasional wrong word or "sound a like" substitutions may have occurred due to the inherent limitations of voice recognition software  Read the chart carefully and recognize, using context, where substitutions have occurred

## 2021-02-23 ENCOUNTER — OFFICE VISIT (OUTPATIENT)
Dept: FAMILY MEDICINE CLINIC | Facility: CLINIC | Age: 66
End: 2021-02-23
Payer: MEDICARE

## 2021-02-23 VITALS
WEIGHT: 217 LBS | BODY MASS INDEX: 34.87 KG/M2 | TEMPERATURE: 96.1 F | DIASTOLIC BLOOD PRESSURE: 60 MMHG | HEART RATE: 78 BPM | SYSTOLIC BLOOD PRESSURE: 98 MMHG | RESPIRATION RATE: 14 BRPM | HEIGHT: 66 IN

## 2021-02-23 DIAGNOSIS — M17.0 PRIMARY OSTEOARTHRITIS OF BOTH KNEES: Primary | ICD-10-CM

## 2021-02-23 PROCEDURE — 20610 DRAIN/INJ JOINT/BURSA W/O US: CPT | Performed by: FAMILY MEDICINE

## 2021-02-23 PROCEDURE — 96372 THER/PROPH/DIAG INJ SC/IM: CPT | Performed by: FAMILY MEDICINE

## 2021-02-23 NOTE — PATIENT INSTRUCTIONS
Knee Exercises   AMBULATORY CARE:   What you need to know about knee exercises:  Knee exercises help strengthen the muscles around your knee  Strong muscles can help reduce pain and decrease your risk of future injury  Knee exercises also help you heal after an injury or surgery  · Start slow  These are beginning exercises  Ask your healthcare provider if you need to see a physical therapist for more advanced exercises  As you get stronger, you may be able to do more sets of each exercise or add weights  · Stop if you feel pain  It is normal to feel some discomfort at first  Regular exercise will help decrease your discomfort over time  · Do the exercises on both legs  Do this so both knees remain strong  · Warm up before you do knee exercises  Walk or ride a stationary bike for 5 or 10 minutes to warm your muscles  How to perform knee stretches safely:  Always stretch before you do strengthening exercises  Do these stretching exercises again after you do the strengthening exercises  Do these stretches 4 or 5 days a week, or as directed  · Standing calf stretch: Face a wall and place both palms flat on the wall, or hold the back of a chair for balance  Keep a slight bend in your knees  Take a big step backward with one leg  Keep your other leg directly under you  Keep both heels flat and press your hips forward  Hold the stretch for 30 seconds, and then relax for 30 seconds  Switch legs  Repeat 2 or 3 times on each leg  · Standing quadriceps stretch:  Stand and place one hand against a wall or hold the back of a chair for balance  With your weight on one leg, bend your other leg and grab your ankle  Bring your heel toward your buttocks  Hold the stretch for 30 to 60 seconds  Switch legs  Repeat 2 or 3 times on each leg  · Sitting hamstring stretch:  Sit with both legs straight in front of you  Do not point or flex your toes   Place your palms on the floor and slide your hands forward until you feel the stretch  Do not round your back  Hold the stretch for 30 seconds  Repeat 2 or 3 times  How to perform knee strengthening exercises safely:  Do these exercises 4 or 5 days a week, or as directed  · Standing half squats:  Stand with your feet shoulder-width apart  Lean your back against a wall or hold the back of a chair for balance, if needed  Slowly sit down about 10 inches, as if you are going to sit in a chair  Your body weight should be mostly over your heels  Hold the squat for 5 seconds, then rise to a standing position  Do 3 sets of 10 squats to strengthen your buttocks and thighs  · Standing hamstring curls: Face a wall and place both palms flat on the wall, or hold the back of a chair for balance  With your weight on one leg, lift your other foot as close to your buttocks as you can  Hold for 5 seconds and then lower your leg  Do 2 sets of 10 curls on each leg  This exercise strengthens the muscles in the back of your thigh  · Standing calf raises:  Face a wall and place both palms flat on the wall, or hold the back of a chair for balance  Stand up straight, and do not lean  Place all your weight on one leg by lifting the other foot off the floor  Raise the heel of the foot that is on the floor as high as you can and then lower it  Do 2 sets of 10 calf raises on each leg to strengthen your calf muscles  · Straight leg lifts:  Lie on your stomach with straight legs  Fold your arms in front of you and rest your head in your arms  Tighten your leg muscles and raise one leg as high as you can  Hold for 5 seconds, then lower your leg  Do 2 sets of 10 lifts on each leg to strengthen your buttocks  · Sitting leg lifts:  Sit in a chair  Slowly straighten and raise one leg  Squeeze your thigh muscles and hold for 5 seconds  Relax and return your foot to the floor  Do 2 sets of 10 lifts on each leg   This helps strengthen the muscles in the front of your thigh  Contact your healthcare provider if:   · You have new pain or your pain becomes worse  · You have questions or concerns about your condition or care  © Copyright [address] Information is for End User's use only and may not be sold, redistributed or otherwise used for commercial purposes  All illustrations and images included in CareNotes® are the copyrighted property of [employer]  or [address] [place], [place] [address]corey Rod   The above information is an  only  It is not intended as medical advice for individual conditions or treatments  Talk to your doctor, nurse or pharmacist before following any medical regimen to see if it is safe and effective for you

## 2021-03-02 DIAGNOSIS — L90.0 LICHEN SCLEROSUS: ICD-10-CM

## 2021-03-30 DIAGNOSIS — E06.3 CHRONIC AUTOIMMUNE THYROIDITIS: ICD-10-CM

## 2021-03-30 RX ORDER — LEVOTHYROXINE SODIUM 88 UG/1
TABLET ORAL
Qty: 124 TABLET | Refills: 1 | Status: SHIPPED | OUTPATIENT
Start: 2021-03-30

## 2021-05-14 DIAGNOSIS — L90.0 LICHEN SCLEROSUS: ICD-10-CM

## 2021-05-14 DIAGNOSIS — M54.2 CERVICAL MUSCLE PAIN: ICD-10-CM

## 2021-05-14 RX ORDER — NAPROXEN 500 MG/1
500 TABLET ORAL 2 TIMES DAILY WITH MEALS
Qty: 20 TABLET | Refills: 0 | Status: SHIPPED | OUTPATIENT
Start: 2021-05-14 | End: 2021-05-24

## 2021-10-25 NOTE — ASSESSMENT & PLAN NOTE
Likely musculoskeletal in origin based on normal physical exam, likely secondary to recent exertion with moving  Recommended that she do naproxen 500 milligrams b i d  Scheduled for 1 week then p r n  Bactrim Pregnancy And Lactation Text: This medication is Pregnancy Category D and is known to cause fetal risk.  It is also excreted in breast milk.

## 2022-09-19 NOTE — ASSESSMENT & PLAN NOTE
Currently smokes approximately 1 cigarette per day  Discussed with patient the risks of continued smoking  Discussed current use pattern  I advised patient to quit, and offered support  Pt expressed understanding, is pre-contemplative at this time  Asked patient to inform me when she sets a quit date  Offered assistance for whenever she is ready to quit  I spent approximately 3 minutes counseling the patient  Quality 265: Biopsy Follow-Up: Biopsy results reviewed, communicated, tracked, and documented Detail Level: Detailed Quality 130: Documentation Of Current Medications In The Medical Record: Current Medications Documented Quality 402: Tobacco Use And Help With Quitting Among Adolescents: Patient screened for tobacco and never smoked Quality 111:Pneumonia Vaccination Status For Older Adults: Pneumococcal Vaccination not Administered or Previously Received, Reason not Otherwise Specified Quality 128: Preventive Care And Screening: Body Mass Index (Bmi) Screening And Follow-Up Plan: BMI is documented above normal parameters and a follow-up plan is documented Quality 431: Preventive Care And Screening: Unhealthy Alcohol Use - Screening: Patient not identified as an unhealthy alcohol user when screened for unhealthy alcohol use using a systematic screening method Quality 110: Preventive Care And Screening: Influenza Immunization: Influenza Immunization not Administered for Documented Reasons.

## 2024-03-22 ENCOUNTER — OFFICE VISIT (OUTPATIENT)
Dept: URBAN - METROPOLITAN AREA CLINIC 86 | Facility: CLINIC | Age: 69
End: 2024-03-22
Payer: MEDICARE

## 2024-03-22 DIAGNOSIS — H43.812 VITREOUS DEGENERATION, LEFT EYE: ICD-10-CM

## 2024-03-22 DIAGNOSIS — H35.3122 NEXDTVE AGE-RELATED MCLR DEGN, LEFT EYE, INTERMED DRY STAGE: ICD-10-CM

## 2024-03-22 DIAGNOSIS — H25.13 AGE-RELATED NUCLEAR CATARACT, BILATERAL: ICD-10-CM

## 2024-03-22 DIAGNOSIS — H35.3211 EXDTVE AGE-REL MCLR DEGN, RIGHT EYE, WITH ACTV CHRDL NEOVAS: Primary | ICD-10-CM

## 2024-03-22 PROCEDURE — 92134 CPTRZ OPH DX IMG PST SGM RTA: CPT | Performed by: OPHTHALMOLOGY

## 2024-03-22 PROCEDURE — 99204 OFFICE O/P NEW MOD 45 MIN: CPT | Performed by: OPHTHALMOLOGY

## 2024-03-22 ASSESSMENT — INTRAOCULAR PRESSURE
OS: 12
OD: 15

## 2024-09-30 ENCOUNTER — APPOINTMENT (RX ONLY)
Dept: URBAN - METROPOLITAN AREA CLINIC 67 | Facility: CLINIC | Age: 69
Setting detail: DERMATOLOGY
End: 2024-09-30

## 2024-09-30 DIAGNOSIS — D18.0 HEMANGIOMA: ICD-10-CM

## 2024-09-30 DIAGNOSIS — D22 MELANOCYTIC NEVI: ICD-10-CM

## 2024-09-30 DIAGNOSIS — D485 NEOPLASM OF UNCERTAIN BEHAVIOR OF SKIN: ICD-10-CM

## 2024-09-30 DIAGNOSIS — L82.1 OTHER SEBORRHEIC KERATOSIS: ICD-10-CM

## 2024-09-30 DIAGNOSIS — L81.4 OTHER MELANIN HYPERPIGMENTATION: ICD-10-CM

## 2024-09-30 DIAGNOSIS — L57.0 ACTINIC KERATOSIS: ICD-10-CM

## 2024-09-30 DIAGNOSIS — L82.0 INFLAMED SEBORRHEIC KERATOSIS: ICD-10-CM

## 2024-09-30 PROBLEM — D48.5 NEOPLASM OF UNCERTAIN BEHAVIOR OF SKIN: Status: ACTIVE | Noted: 2024-09-30

## 2024-09-30 PROBLEM — D22.5 MELANOCYTIC NEVI OF TRUNK: Status: ACTIVE | Noted: 2024-09-30

## 2024-09-30 PROBLEM — D18.01 HEMANGIOMA OF SKIN AND SUBCUTANEOUS TISSUE: Status: ACTIVE | Noted: 2024-09-30

## 2024-09-30 PROCEDURE — ? DEFER

## 2024-09-30 PROCEDURE — ? COUNSELING

## 2024-09-30 PROCEDURE — 99203 OFFICE O/P NEW LOW 30 MIN: CPT

## 2024-09-30 ASSESSMENT — LOCATION DETAILED DESCRIPTION DERM
LOCATION DETAILED: RIGHT ANTERIOR SHOULDER
LOCATION DETAILED: LEFT INFERIOR MEDIAL FOREHEAD
LOCATION DETAILED: LEFT ANTERIOR SHOULDER
LOCATION DETAILED: LEFT SUPERIOR MEDIAL MIDBACK
LOCATION DETAILED: SUBXIPHOID
LOCATION DETAILED: LEFT MID-UPPER BACK
LOCATION DETAILED: UPPER STERNUM
LOCATION DETAILED: PERIUMBILICAL SKIN
LOCATION DETAILED: LEFT LATERAL PROXIMAL PRETIBIAL REGION

## 2024-09-30 ASSESSMENT — LOCATION SIMPLE DESCRIPTION DERM
LOCATION SIMPLE: CHEST
LOCATION SIMPLE: LEFT PRETIBIAL REGION
LOCATION SIMPLE: LEFT FOREHEAD
LOCATION SIMPLE: ABDOMEN
LOCATION SIMPLE: LEFT LOWER BACK
LOCATION SIMPLE: LEFT SHOULDER
LOCATION SIMPLE: LEFT UPPER BACK
LOCATION SIMPLE: RIGHT SHOULDER

## 2024-09-30 ASSESSMENT — LOCATION ZONE DERM
LOCATION ZONE: FACE
LOCATION ZONE: TRUNK
LOCATION ZONE: ARM
LOCATION ZONE: LEG

## 2024-09-30 NOTE — PROCEDURE: DEFER
Instructions (Optional): Right temple and left forearm, Trunk
Procedure To Be Performed At Next Visit: Cryotherapy
Size Of Lesion In Cm (Optional): 0
Detail Level: Detailed
Introduction Text (Please End With A Colon): The following procedure was deferred: Biopsy
Instructions (Optional): Arms and face
Procedure To Be Performed At Next Visit: Biopsy by shave method
Instructions (Optional): A. Left lateral proximal pretibial region\\nSize: 1.2cm\\nR/O: SL vs LM\\nB. Left mid-upper back\\nSize: 0.6cm\\nR/O: BN vs DN vs MM

## 2024-09-30 NOTE — PROCEDURE: MIPS QUALITY
Quality 130: Documentation Of Current Medications In The Medical Record: Current Medications Documented
Detail Level: Generalized
Quality 47: Advance Care Plan: Advance care planning not documented, reason not otherwise specified.

## 2024-10-21 ENCOUNTER — APPOINTMENT (RX ONLY)
Dept: URBAN - METROPOLITAN AREA CLINIC 67 | Facility: CLINIC | Age: 69
Setting detail: DERMATOLOGY
End: 2024-10-21

## 2024-10-21 DIAGNOSIS — L82.0 INFLAMED SEBORRHEIC KERATOSIS: ICD-10-CM

## 2024-10-21 DIAGNOSIS — L57.0 ACTINIC KERATOSIS: ICD-10-CM

## 2024-10-21 DIAGNOSIS — D485 NEOPLASM OF UNCERTAIN BEHAVIOR OF SKIN: ICD-10-CM

## 2024-10-21 PROBLEM — D48.5 NEOPLASM OF UNCERTAIN BEHAVIOR OF SKIN: Status: ACTIVE | Noted: 2024-10-21

## 2024-10-21 PROCEDURE — 17000 DESTRUCT PREMALG LESION: CPT | Mod: 59

## 2024-10-21 PROCEDURE — 11301 SHAVE SKIN LESION 0.6-1.0 CM: CPT

## 2024-10-21 PROCEDURE — 17110 DESTRUCTION B9 LES UP TO 14: CPT | Mod: 59

## 2024-10-21 PROCEDURE — ? LIQUID NITROGEN

## 2024-10-21 PROCEDURE — ? SHAVE REMOVAL

## 2024-10-21 PROCEDURE — ? COUNSELING

## 2024-10-21 PROCEDURE — 17003 DESTRUCT PREMALG LES 2-14: CPT | Mod: 59

## 2024-10-21 PROCEDURE — 11302 SHAVE SKIN LESION 1.1-2.0 CM: CPT

## 2024-10-21 ASSESSMENT — LOCATION DETAILED DESCRIPTION DERM
LOCATION DETAILED: RIGHT DISTAL DORSAL FOREARM
LOCATION DETAILED: RIGHT INFERIOR MEDIAL FOREHEAD
LOCATION DETAILED: LEFT VENTRAL LATERAL DISTAL FOREARM
LOCATION DETAILED: LEFT ANTERIOR MEDIAL DISTAL THIGH
LOCATION DETAILED: RIGHT LATERAL TEMPLE
LOCATION DETAILED: LEFT LATERAL PROXIMAL PRETIBIAL REGION
LOCATION DETAILED: RIGHT PROXIMAL DORSAL FOREARM
LOCATION DETAILED: LEFT MID-UPPER BACK
LOCATION DETAILED: LEFT ANTERIOR PROXIMAL THIGH

## 2024-10-21 ASSESSMENT — LOCATION ZONE DERM
LOCATION ZONE: LEG
LOCATION ZONE: TRUNK
LOCATION ZONE: ARM
LOCATION ZONE: FACE

## 2024-10-21 ASSESSMENT — LOCATION SIMPLE DESCRIPTION DERM
LOCATION SIMPLE: RIGHT FOREHEAD
LOCATION SIMPLE: LEFT FOREARM
LOCATION SIMPLE: LEFT THIGH
LOCATION SIMPLE: RIGHT TEMPLE
LOCATION SIMPLE: LEFT PRETIBIAL REGION
LOCATION SIMPLE: LEFT UPPER BACK
LOCATION SIMPLE: RIGHT FOREARM

## 2024-10-21 NOTE — PROCEDURE: SHAVE REMOVAL
Medical Necessity Information: It is in your best interest to select a reason for this procedure from the list below. All of these items fulfill various CMS LCD requirements except the new and changing color options.
Medical Necessity Clause: This procedure was medically necessary because the lesion that was treated was:
Lab: [id number]
Lab Facility: [id number]
Detail Level: Detailed
Was A Bandage Applied: Yes
Size Of Lesion In Cm (Required): 1.2
X Size Of Lesion In Cm (Optional): 0
Depth Of Shave: dermis
Biopsy Method: Dermablade
Anesthesia Type: 1% lidocaine with epinephrine
Hemostasis: Electrocautery
Wound Care: Petrolatum
Path Notes (To The Dermatopathologist): Size: 1.2cm\\nR/O: SL vs LM
Render Path Notes In Note?: No
Consent was obtained from the patient. The risks and benefits to therapy were discussed in detail. Specifically, the risks of infection, scarring, bleeding, prolonged wound healing, incomplete removal, allergy to anesthesia, nerve injury and recurrence were addressed. Prior to the procedure, the treatment site was clearly identified and confirmed by the patient. All components of Universal Protocol/PAUSE Rule completed.
Post-Care Instructions: I reviewed with the patient in detail post-care instructions. Patient is to keep the biopsy site dry overnight, and then apply bacitracin twice daily until healed. Patient may apply hydrogen peroxide soaks to remove any crusting.
Notification Instructions: Patient will be notified of pathology results. However, patient instructed to call the office if not contacted within 2 weeks.
Billing Type: Third-Party Bill
Size Of Lesion In Cm (Required): 0.6
Path Notes (To The Dermatopathologist): Size: 0.6cm\\nR/O: BN vs DN vs MM

## 2024-10-21 NOTE — PROCEDURE: LIQUID NITROGEN
Number Of Freeze-Thaw Cycles: 2 freeze-thaw cycles
Duration Of Freeze Thaw-Cycle (Seconds): 3
Post-Care Instructions: I reviewed with the patient in detail post-care instructions. Patient is to wear sunprotection, and avoid picking at any of the treated lesions. Pt may apply Vaseline to crusted or scabbing areas.
Application Tool (Optional): Liquid Nitrogen Sprayer
Render Note In Bullet Format When Appropriate: No
Detail Level: Detailed
Show Applicator Variable?: Yes
Consent: The patient's consent was obtained including but not limited to risks of crusting, scabbing, blistering, scarring, darker or lighter pigmentary change, recurrence, incomplete removal and infection.
Spray Paint Text: The liquid nitrogen was applied to the skin utilizing a spray paint frosting technique.
Medical Necessity Information: It is in your best interest to select a reason for this procedure from the list below. All of these items fulfill various CMS LCD requirements except the new and changing color options.
Medical Necessity Clause: This procedure was medically necessary because the lesions that were treated were:

## 2024-11-06 ENCOUNTER — APPOINTMENT (RX ONLY)
Dept: URBAN - METROPOLITAN AREA CLINIC 67 | Facility: CLINIC | Age: 69
Setting detail: DERMATOLOGY
End: 2024-11-06

## 2024-11-06 DIAGNOSIS — D22 MELANOCYTIC NEVI: ICD-10-CM

## 2024-11-06 DIAGNOSIS — L81.4 OTHER MELANIN HYPERPIGMENTATION: ICD-10-CM

## 2024-11-06 PROBLEM — D22.5 MELANOCYTIC NEVI OF TRUNK: Status: ACTIVE | Noted: 2024-11-06

## 2024-11-06 PROCEDURE — ? CONSULTATION EXCISION

## 2024-11-06 PROCEDURE — ? COUNSELING

## 2024-11-06 PROCEDURE — ? PATHOLOGY DISCUSSION

## 2024-11-06 PROCEDURE — 99212 OFFICE O/P EST SF 10 MIN: CPT

## 2024-11-06 PROCEDURE — ? DEFER

## 2024-11-06 ASSESSMENT — LOCATION SIMPLE DESCRIPTION DERM
LOCATION SIMPLE: LEFT UPPER BACK
LOCATION SIMPLE: LEFT PRETIBIAL REGION

## 2024-11-06 ASSESSMENT — LOCATION ZONE DERM
LOCATION ZONE: LEG
LOCATION ZONE: TRUNK

## 2024-11-06 ASSESSMENT — LOCATION DETAILED DESCRIPTION DERM
LOCATION DETAILED: LEFT LATERAL PROXIMAL PRETIBIAL REGION
LOCATION DETAILED: LEFT MID-UPPER BACK

## 2024-11-21 ENCOUNTER — APPOINTMENT (RX ONLY)
Dept: URBAN - METROPOLITAN AREA CLINIC 67 | Facility: CLINIC | Age: 69
Setting detail: DERMATOLOGY
End: 2024-11-21

## 2024-11-21 DIAGNOSIS — D49.2 NEOPLASM OF UNSPECIFIED BEHAVIOR OF BONE, SOFT TISSUE, AND SKIN: ICD-10-CM

## 2024-11-21 PROCEDURE — ? COUNSELING

## 2024-11-21 PROCEDURE — 12032 INTMD RPR S/A/T/EXT 2.6-7.5: CPT

## 2024-11-21 PROCEDURE — 11402 EXC TR-EXT B9+MARG 1.1-2 CM: CPT

## 2024-11-21 PROCEDURE — ? EXCISION

## 2024-11-21 ASSESSMENT — LOCATION SIMPLE DESCRIPTION DERM: LOCATION SIMPLE: LEFT UPPER BACK

## 2024-11-21 ASSESSMENT — LOCATION DETAILED DESCRIPTION DERM: LOCATION DETAILED: LEFT MID-UPPER BACK

## 2024-11-21 ASSESSMENT — LOCATION ZONE DERM: LOCATION ZONE: TRUNK

## 2024-11-21 NOTE — PROCEDURE: EXCISION

## 2024-12-05 ENCOUNTER — APPOINTMENT (OUTPATIENT)
Dept: URBAN - METROPOLITAN AREA CLINIC 67 | Facility: CLINIC | Age: 69
Setting detail: DERMATOLOGY
End: 2024-12-05

## 2024-12-05 ENCOUNTER — TELEPHONE (OUTPATIENT)
Dept: GASTROENTEROLOGY | Facility: CLINIC | Age: 69
End: 2024-12-05

## 2024-12-05 DIAGNOSIS — L82.0 INFLAMED SEBORRHEIC KERATOSIS: ICD-10-CM

## 2024-12-05 DIAGNOSIS — Z48.02 ENCOUNTER FOR REMOVAL OF SUTURES: ICD-10-CM

## 2024-12-05 PROCEDURE — 17110 DESTRUCTION B9 LES UP TO 14: CPT

## 2024-12-05 PROCEDURE — ? LIQUID NITROGEN

## 2024-12-05 PROCEDURE — ? SUTURE REMOVAL (GLOBAL PERIOD)

## 2024-12-05 PROCEDURE — ? COUNSELING

## 2024-12-05 ASSESSMENT — LOCATION SIMPLE DESCRIPTION DERM
LOCATION SIMPLE: LEFT UPPER ARM
LOCATION SIMPLE: LEFT UPPER BACK

## 2024-12-05 ASSESSMENT — LOCATION ZONE DERM
LOCATION ZONE: ARM
LOCATION ZONE: TRUNK

## 2024-12-05 ASSESSMENT — LOCATION DETAILED DESCRIPTION DERM
LOCATION DETAILED: LEFT ANTERIOR DISTAL UPPER ARM
LOCATION DETAILED: LEFT MID-UPPER BACK

## 2024-12-05 NOTE — TELEPHONE ENCOUNTER
Sending attempt to contact letter to address on file. Prior attempts to contact unsuccessful. 5 yr colon recall, hist polyps, Dr. Stuart.